# Patient Record
Sex: FEMALE | Race: WHITE | NOT HISPANIC OR LATINO | URBAN - METROPOLITAN AREA
[De-identification: names, ages, dates, MRNs, and addresses within clinical notes are randomized per-mention and may not be internally consistent; named-entity substitution may affect disease eponyms.]

---

## 2019-09-13 ENCOUNTER — INPATIENT (INPATIENT)
Facility: HOSPITAL | Age: 84
LOS: 1 days | Discharge: HOME | DRG: 603 | End: 2019-09-15
Attending: FAMILY MEDICINE | Admitting: FAMILY MEDICINE
Payer: MEDICARE

## 2019-09-13 VITALS — WEIGHT: 138.01 LBS | HEIGHT: 62 IN

## 2019-09-13 DIAGNOSIS — Z98.49 CATARACT EXTRACTION STATUS, UNSPECIFIED EYE: Chronic | ICD-10-CM

## 2019-09-13 DIAGNOSIS — L03.211 CELLULITIS OF FACE: ICD-10-CM

## 2019-09-13 LAB
ALBUMIN SERPL ELPH-MCNC: 3.5 G/DL — SIGNIFICANT CHANGE UP (ref 3.3–5)
ALP SERPL-CCNC: 59 U/L — SIGNIFICANT CHANGE UP (ref 40–120)
ALT FLD-CCNC: 32 U/L — SIGNIFICANT CHANGE UP (ref 12–78)
ANION GAP SERPL CALC-SCNC: 5 MMOL/L — SIGNIFICANT CHANGE UP (ref 5–17)
APPEARANCE UR: CLEAR — SIGNIFICANT CHANGE UP
APTT BLD: 27.1 SEC — LOW (ref 27.5–36.3)
AST SERPL-CCNC: 25 U/L — SIGNIFICANT CHANGE UP (ref 15–37)
BASOPHILS # BLD AUTO: 0.04 K/UL — SIGNIFICANT CHANGE UP (ref 0–0.2)
BASOPHILS NFR BLD AUTO: 0.4 % — SIGNIFICANT CHANGE UP (ref 0–2)
BILIRUB SERPL-MCNC: 0.4 MG/DL — SIGNIFICANT CHANGE UP (ref 0.2–1.2)
BILIRUB UR-MCNC: NEGATIVE — SIGNIFICANT CHANGE UP
BUN SERPL-MCNC: 17 MG/DL — SIGNIFICANT CHANGE UP (ref 7–23)
CALCIUM SERPL-MCNC: 8.8 MG/DL — SIGNIFICANT CHANGE UP (ref 8.5–10.1)
CHLORIDE SERPL-SCNC: 102 MMOL/L — SIGNIFICANT CHANGE UP (ref 96–108)
CO2 SERPL-SCNC: 29 MMOL/L — SIGNIFICANT CHANGE UP (ref 22–31)
COLOR SPEC: YELLOW — SIGNIFICANT CHANGE UP
CREAT SERPL-MCNC: 0.64 MG/DL — SIGNIFICANT CHANGE UP (ref 0.5–1.3)
DIFF PNL FLD: ABNORMAL
EOSINOPHIL # BLD AUTO: 0.02 K/UL — SIGNIFICANT CHANGE UP (ref 0–0.5)
EOSINOPHIL NFR BLD AUTO: 0.2 % — SIGNIFICANT CHANGE UP (ref 0–6)
GLUCOSE SERPL-MCNC: 124 MG/DL — HIGH (ref 70–99)
GLUCOSE UR QL: NEGATIVE MG/DL — SIGNIFICANT CHANGE UP
HCT VFR BLD CALC: 41.3 % — SIGNIFICANT CHANGE UP (ref 34.5–45)
HGB BLD-MCNC: 13.7 G/DL — SIGNIFICANT CHANGE UP (ref 11.5–15.5)
IMM GRANULOCYTES NFR BLD AUTO: 0.7 % — SIGNIFICANT CHANGE UP (ref 0–1.5)
INR BLD: 1.14 RATIO — SIGNIFICANT CHANGE UP (ref 0.88–1.16)
KETONES UR-MCNC: ABNORMAL
LACTATE SERPL-SCNC: 0.9 MMOL/L — SIGNIFICANT CHANGE UP (ref 0.7–2)
LEUKOCYTE ESTERASE UR-ACNC: NEGATIVE — SIGNIFICANT CHANGE UP
LYMPHOCYTES # BLD AUTO: 0.65 K/UL — LOW (ref 1–3.3)
LYMPHOCYTES # BLD AUTO: 7.2 % — LOW (ref 13–44)
MCHC RBC-ENTMCNC: 29.5 PG — SIGNIFICANT CHANGE UP (ref 27–34)
MCHC RBC-ENTMCNC: 33.2 GM/DL — SIGNIFICANT CHANGE UP (ref 32–36)
MCV RBC AUTO: 89 FL — SIGNIFICANT CHANGE UP (ref 80–100)
MONOCYTES # BLD AUTO: 0.84 K/UL — SIGNIFICANT CHANGE UP (ref 0–0.9)
MONOCYTES NFR BLD AUTO: 9.3 % — SIGNIFICANT CHANGE UP (ref 2–14)
NEUTROPHILS # BLD AUTO: 7.41 K/UL — HIGH (ref 1.8–7.4)
NEUTROPHILS NFR BLD AUTO: 82.2 % — HIGH (ref 43–77)
NITRITE UR-MCNC: NEGATIVE — SIGNIFICANT CHANGE UP
PH UR: 5 — SIGNIFICANT CHANGE UP (ref 5–8)
PLATELET # BLD AUTO: 174 K/UL — SIGNIFICANT CHANGE UP (ref 150–400)
POTASSIUM SERPL-MCNC: 3.7 MMOL/L — SIGNIFICANT CHANGE UP (ref 3.5–5.3)
POTASSIUM SERPL-SCNC: 3.7 MMOL/L — SIGNIFICANT CHANGE UP (ref 3.5–5.3)
PROT SERPL-MCNC: 7.5 GM/DL — SIGNIFICANT CHANGE UP (ref 6–8.3)
PROT UR-MCNC: 30 MG/DL
PROTHROM AB SERPL-ACNC: 12.7 SEC — SIGNIFICANT CHANGE UP (ref 10–12.9)
RBC # BLD: 4.64 M/UL — SIGNIFICANT CHANGE UP (ref 3.8–5.2)
RBC # FLD: 13.9 % — SIGNIFICANT CHANGE UP (ref 10.3–14.5)
SODIUM SERPL-SCNC: 136 MMOL/L — SIGNIFICANT CHANGE UP (ref 135–145)
SP GR SPEC: 1.01 — SIGNIFICANT CHANGE UP (ref 1.01–1.02)
UROBILINOGEN FLD QL: NEGATIVE MG/DL — SIGNIFICANT CHANGE UP
WBC # BLD: 9.02 K/UL — SIGNIFICANT CHANGE UP (ref 3.8–10.5)
WBC # FLD AUTO: 9.02 K/UL — SIGNIFICANT CHANGE UP (ref 3.8–10.5)

## 2019-09-13 PROCEDURE — 81001 URINALYSIS AUTO W/SCOPE: CPT

## 2019-09-13 PROCEDURE — 84132 ASSAY OF SERUM POTASSIUM: CPT

## 2019-09-13 PROCEDURE — 85025 COMPLETE CBC W/AUTO DIFF WBC: CPT

## 2019-09-13 PROCEDURE — 93010 ELECTROCARDIOGRAM REPORT: CPT

## 2019-09-13 PROCEDURE — 71045 X-RAY EXAM CHEST 1 VIEW: CPT

## 2019-09-13 PROCEDURE — 71045 X-RAY EXAM CHEST 1 VIEW: CPT | Mod: 26

## 2019-09-13 PROCEDURE — 84100 ASSAY OF PHOSPHORUS: CPT

## 2019-09-13 PROCEDURE — 87086 URINE CULTURE/COLONY COUNT: CPT

## 2019-09-13 PROCEDURE — 36415 COLL VENOUS BLD VENIPUNCTURE: CPT

## 2019-09-13 PROCEDURE — 80048 BASIC METABOLIC PNL TOTAL CA: CPT

## 2019-09-13 PROCEDURE — 83735 ASSAY OF MAGNESIUM: CPT

## 2019-09-13 RX ORDER — VANCOMYCIN HCL 1 G
1000 VIAL (EA) INTRAVENOUS ONCE
Refills: 0 | Status: COMPLETED | OUTPATIENT
Start: 2019-09-13 | End: 2019-09-13

## 2019-09-13 RX ORDER — PIPERACILLIN AND TAZOBACTAM 4; .5 G/20ML; G/20ML
3.38 INJECTION, POWDER, LYOPHILIZED, FOR SOLUTION INTRAVENOUS ONCE
Refills: 0 | Status: COMPLETED | OUTPATIENT
Start: 2019-09-13 | End: 2019-09-13

## 2019-09-13 RX ORDER — INFLUENZA VIRUS VACCINE 15; 15; 15; 15 UG/.5ML; UG/.5ML; UG/.5ML; UG/.5ML
0.5 SUSPENSION INTRAMUSCULAR ONCE
Refills: 0 | Status: COMPLETED | OUTPATIENT
Start: 2019-09-13 | End: 2019-09-13

## 2019-09-13 RX ORDER — DOCUSATE SODIUM 100 MG
100 CAPSULE ORAL DAILY
Refills: 0 | Status: DISCONTINUED | OUTPATIENT
Start: 2019-09-13 | End: 2019-09-15

## 2019-09-13 RX ORDER — PIPERACILLIN AND TAZOBACTAM 4; .5 G/20ML; G/20ML
3.38 INJECTION, POWDER, LYOPHILIZED, FOR SOLUTION INTRAVENOUS EVERY 8 HOURS
Refills: 0 | Status: DISCONTINUED | OUTPATIENT
Start: 2019-09-13 | End: 2019-09-14

## 2019-09-13 RX ORDER — SODIUM CHLORIDE 9 MG/ML
1950 INJECTION INTRAMUSCULAR; INTRAVENOUS; SUBCUTANEOUS ONCE
Refills: 0 | Status: COMPLETED | OUTPATIENT
Start: 2019-09-13 | End: 2019-09-13

## 2019-09-13 RX ORDER — HEPARIN SODIUM 5000 [USP'U]/ML
5000 INJECTION INTRAVENOUS; SUBCUTANEOUS
Refills: 0 | Status: DISCONTINUED | OUTPATIENT
Start: 2019-09-13 | End: 2019-09-15

## 2019-09-13 RX ORDER — SODIUM CHLORIDE 9 MG/ML
1000 INJECTION INTRAMUSCULAR; INTRAVENOUS; SUBCUTANEOUS
Refills: 0 | Status: DISCONTINUED | OUTPATIENT
Start: 2019-09-13 | End: 2019-09-15

## 2019-09-13 RX ADMIN — SODIUM CHLORIDE 100 MILLILITER(S): 9 INJECTION INTRAMUSCULAR; INTRAVENOUS; SUBCUTANEOUS at 22:24

## 2019-09-13 RX ADMIN — HEPARIN SODIUM 5000 UNIT(S): 5000 INJECTION INTRAVENOUS; SUBCUTANEOUS at 22:24

## 2019-09-13 RX ADMIN — Medication 1000 MILLIGRAM(S): at 19:32

## 2019-09-13 RX ADMIN — Medication 250 MILLIGRAM(S): at 18:30

## 2019-09-13 RX ADMIN — SODIUM CHLORIDE 1950 MILLILITER(S): 9 INJECTION INTRAMUSCULAR; INTRAVENOUS; SUBCUTANEOUS at 18:30

## 2019-09-13 RX ADMIN — PIPERACILLIN AND TAZOBACTAM 200 GRAM(S): 4; .5 INJECTION, POWDER, LYOPHILIZED, FOR SOLUTION INTRAVENOUS at 19:32

## 2019-09-13 NOTE — H&P ADULT - NSHPPHYSICALEXAM_GEN_ALL_CORE
ICU Vital Signs Last 24 Hrs    T(F): 98.5 (13 Sep 2019 19:26), Max: 99.8 (13 Sep 2019 17:29)  HR: 87 (13 Sep 2019 19:26) (87 - 108)  BP: 137/76 (13 Sep 2019 19:26) (137/76 - 153/78)    RR: 18 (13 Sep 2019 19:26) (18 - 18)  SpO2: 98% (13 Sep 2019 19:26) (95% - 98%)

## 2019-09-13 NOTE — ED STATDOCS - NS_ ATTENDINGSCRIBEDETAILS _ED_A_ED_FT
I, Jasbir Adams MD,  performed the initial face to face bedside interview with this patient regarding history of present illness, review of symptoms and relevant past medical, social and family history.  I completed an independent physical examination.  I was the initial provider who evaluated this patient.  The history, relevant review of systems, past medical and surgical history, medical decision making, and physical examination was documented by the scribe in my presence and I attest to the accuracy of the documentation.

## 2019-09-13 NOTE — ED ADULT NURSE NOTE - OBJECTIVE STATEMENT
Patient presents to the ED c/o infection. States her symptoms started 5 days ago with a fever (Tmax 101.5), then pt started to develop redness on her forehead. Pt states that she was seen by her PCP Dr. Pandey and was told to come to the ED for the infection. Has been taking Tylenol for her symptoms. Has not taken any abx yet. Denies N/V, SOB, chest pain.

## 2019-09-13 NOTE — ED STATDOCS - PHYSICAL EXAMINATION
Constitutional: NAD AAOx3  Eyes: PERRLA EOMI  Head: Normocephalic atraumatic  Mouth: MMM  Cardiac: regular rate   Resp: Lungs CTAB  GI: Abd s/nt/nd  Neuro: CN2-12 intact  Skin: No rashes +erythema to forehead and mid scalp, warmth. no pain with eye movement, normal ROM of neck, small abrasion to left scalp Constitutional: NAD AAOx3  Eyes: PERRLA EOMI  Head: Normocephalic atraumatic  Mouth: MMM  Cardiac: regular rate   Resp: Lungs CTAB  GI: Abd s/nt/nd  Neuro: CN2-12 intact  Skin: +erythema to forehead and mid scalp, warmth. no pain with eye movement, normal ROM of neck, small abrasion to left scalp

## 2019-09-13 NOTE — ED ADULT NURSE NOTE - NSIMPLEMENTINTERV_GEN_ALL_ED
Implemented All Universal Safety Interventions:  [place] to call system. Call bell, personal items and telephone within reach. Instruct patient to call for assistance. Room bathroom lighting operational. Non-slip footwear when patient is off stretcher. Physically safe environment: no spills, clutter or unnecessary equipment. Stretcher in lowest position, wheels locked, appropriate side rails in place.

## 2019-09-13 NOTE — H&P ADULT - NSICDXFAMILYHX_GEN_ALL_CORE_FT
FAMILY HISTORY:  Family history of coronary artery disease    Sibling  Still living? Yes, Estimated age: 71-80  Family history of von Willebrand disease, Age at diagnosis: Age Unknown

## 2019-09-13 NOTE — ED STATDOCS - CLINICAL SUMMARY MEDICAL DECISION MAKING FREE TEXT BOX
83 y/o female presents to the ED for fever, and cellulitis sent in by PMD, exam with erythema from eyebrows up through mid scalp bilaterally, concern for sepsis secondary to cellulites, will obtain labs, abx and admit.

## 2019-09-13 NOTE — ED STATDOCS - PROGRESS NOTE DETAILS
83 yo female with no significant PMH presents with fever, redness to her forehead. Pt felt something that started sunday and the raash appeared tuesday. Went to see her PDM (Linker) and was told to come to the ER due to having a fever 101.5F in the office. Redness and warmth to touch involving the forehead and spreading to the occipital scalp. Denies bug bites, abd pain, n/v/d, cp. Labs, BC, IV abx for cellulitis of the forehead and scalp. -Raoul Evans PA-C

## 2019-09-13 NOTE — H&P ADULT - NSICDXPASTMEDICALHX_GEN_ALL_CORE_FT
PAST MEDICAL HISTORY:  Arthritis     Hepatitis B resolved    Hip pain, acute, right     Hypertension     Shingles

## 2019-09-13 NOTE — H&P ADULT - HISTORY OF PRESENT ILLNESS
85 y/o female with a PMHx of Shingles, Arthritis, HTN presents to the ED c/o infection. States her symptoms started 5 days ago with a fever (Tmax 101.5), then pt started to develop redness on her forehead. Pt states that she was seen by her PCP Dr. Pandey and was told to come to the ED for the infection. Has been taking Tylenol for her symptoms. Has not taken any abx yet. Denies N/V, SOB, chest pain. Pt is an 83 y/o female with a PMHx of Shingles, Arthritis, HTN not on meds who presents to the ED with c/o fever 101- 102F at home.  She saw her PCP Dr. Pandey today  and was sent to the ED for cellulitis of the scalp ..    Pt reported  her symptoms started with fever  5 days ago with Tmax 101.5.   She then  developed redness on her forehead. She took tylenol.   Pt denies N/V, SOB, chest pain.  Pt reports she last had a skin infection when she had shingles.

## 2019-09-13 NOTE — ED STATDOCS - OBJECTIVE STATEMENT
83 y/o female with a PMHx of Shingles, Arthritis, HTN presents to the ED c/o infection. States her symptoms started 5 days ago with a fever (Tmax 101.5), then pt started to develop redness on her forehead. Pt states that she was seen by her PCP Dr. Pandey and was told to come to the ED for the infection. Has been taking Tylenol for her symptoms. Has not taken any abx yet. Denies N/V, SOB, chest pain.

## 2019-09-13 NOTE — ED STATDOCS - NS ED ROS FT
Constitutional: +fevers  Eyes: No visual changes  HEENT: No throat pain  CV: No chest pain  Resp: No SOB no cough  GI: No abd pain, nausea or vomiting  : No dysuria  MSK: No musculoskeletal pain  Skin: +redness to forehead  Neuro: No headache

## 2019-09-13 NOTE — ED ADULT TRIAGE NOTE - CHIEF COMPLAINT QUOTE
patient ambulated in with a steady gait. states she was sent by her PMD for an infection of the face. face from the bridge of the nose up through the forehead noted to be reddened. patient states it began 2 days ago and spread. patient was told she needs IV antibiotics. + fever at home 101.5F.

## 2019-09-13 NOTE — H&P ADULT - ASSESSMENT
Pt is admitted w/     Cellulitis of the upper face and scalp   - s/p Vanco, Zosyn in  the ED  - s/p NS  1950 ml in the ED  - Blood cx  - ID consult  - DVT prophylaxis heparin  - IMPROVE VTE Individual Risk Assessment    RISK                                                                Points    [  ] Previous VTE                                                  3    [  ] Thrombophilia                                               2    [  ] Lower limb paralysis                                      2        (unable to hold up >15 seconds)      [  ] Current Cancer                                              2         (within 6 months)    [  ] Immobilization > 24 hrs                                1    [  ] ICU/CCU stay > 24 hours                              1    [ X ] Age > 60                                                      1    IMPROVE VTE Score ____1_____    IMPROVE Score 0-1: Low Risk, No VTE prophylaxis required for most patients, encourage ambulation.   IMPROVE Score 2-3: At risk, pharmacologic VTE prophylaxis is indicated for most patients (in the absence of a contraindication)  IMPROVE Score > or = 4: High Risk, pharmacologic VTE prophylaxis is indicated for most patients (in the absence of a contraindication) Pt is admitted w/     Cellulitis of the upper face and scalp   - s/p Vanco, Zosyn in  the ED, will change to Rocephin  - s/p NS  1950 ml in the ED  - Blood cx  - ID consult  - DVT prophylaxis heparin  - IMPROVE VTE Individual Risk Assessment    RISK                                                                Points    [  ] Previous VTE                                                  3    [  ] Thrombophilia                                               2    [  ] Lower limb paralysis                                      2        (unable to hold up >15 seconds)      [  ] Current Cancer                                              2         (within 6 months)    [  ] Immobilization > 24 hrs                                1    [  ] ICU/CCU stay > 24 hours                              1    [ X ] Age > 60                                                      1    IMPROVE VTE Score ____1_____    IMPROVE Score 0-1: Low Risk, No VTE prophylaxis required for most patients, encourage ambulation.   IMPROVE Score 2-3: At risk, pharmacologic VTE prophylaxis is indicated for most patients (in the absence of a contraindication)  IMPROVE Score > or = 4: High Risk, pharmacologic VTE prophylaxis is indicated for most patients (in the absence of a contraindication) Pt is admitted w/     Cellulitis of the upper face and scalp   Hematuria  - s/p Vanco, Zosyn in  the ED, will change to Rocephin  - s/p NS  1950 ml in the ED  - Blood cx  - ID consult  - repeat ua,  follow up with urology if persistent hematuria  - DVT prophylaxis heparin  - IMPROVE VTE Individual Risk Assessment    RISK                                                                Points    [  ] Previous VTE                                                  3    [  ] Thrombophilia                                               2    [  ] Lower limb paralysis                                      2        (unable to hold up >15 seconds)      [  ] Current Cancer                                              2         (within 6 months)    [  ] Immobilization > 24 hrs                                1    [  ] ICU/CCU stay > 24 hours                              1    [ X ] Age > 60                                                      1    IMPROVE VTE Score ____1_____    IMPROVE Score 0-1: Low Risk, No VTE prophylaxis required for most patients, encourage ambulation.   IMPROVE Score 2-3: At risk, pharmacologic VTE prophylaxis is indicated for most patients (in the absence of a contraindication)  IMPROVE Score > or = 4: High Risk, pharmacologic VTE prophylaxis is indicated for most patients (in the absence of a contraindication)

## 2019-09-14 LAB
ANION GAP SERPL CALC-SCNC: 9 MMOL/L — SIGNIFICANT CHANGE UP (ref 5–17)
BASOPHILS # BLD AUTO: 0.04 K/UL — SIGNIFICANT CHANGE UP (ref 0–0.2)
BASOPHILS NFR BLD AUTO: 0.6 % — SIGNIFICANT CHANGE UP (ref 0–2)
BUN SERPL-MCNC: 11 MG/DL — SIGNIFICANT CHANGE UP (ref 7–23)
CALCIUM SERPL-MCNC: 8 MG/DL — LOW (ref 8.5–10.1)
CHLORIDE SERPL-SCNC: 107 MMOL/L — SIGNIFICANT CHANGE UP (ref 96–108)
CO2 SERPL-SCNC: 25 MMOL/L — SIGNIFICANT CHANGE UP (ref 22–31)
CREAT SERPL-MCNC: 0.51 MG/DL — SIGNIFICANT CHANGE UP (ref 0.5–1.3)
CULTURE RESULTS: SIGNIFICANT CHANGE UP
EOSINOPHIL # BLD AUTO: 0.06 K/UL — SIGNIFICANT CHANGE UP (ref 0–0.5)
EOSINOPHIL NFR BLD AUTO: 0.9 % — SIGNIFICANT CHANGE UP (ref 0–6)
GLUCOSE SERPL-MCNC: 98 MG/DL — SIGNIFICANT CHANGE UP (ref 70–99)
HCT VFR BLD CALC: 38.1 % — SIGNIFICANT CHANGE UP (ref 34.5–45)
HGB BLD-MCNC: 12.6 G/DL — SIGNIFICANT CHANGE UP (ref 11.5–15.5)
IMM GRANULOCYTES NFR BLD AUTO: 0.5 % — SIGNIFICANT CHANGE UP (ref 0–1.5)
LYMPHOCYTES # BLD AUTO: 1.23 K/UL — SIGNIFICANT CHANGE UP (ref 1–3.3)
LYMPHOCYTES # BLD AUTO: 19.2 % — SIGNIFICANT CHANGE UP (ref 13–44)
MCHC RBC-ENTMCNC: 30 PG — SIGNIFICANT CHANGE UP (ref 27–34)
MCHC RBC-ENTMCNC: 33.1 GM/DL — SIGNIFICANT CHANGE UP (ref 32–36)
MCV RBC AUTO: 90.7 FL — SIGNIFICANT CHANGE UP (ref 80–100)
MONOCYTES # BLD AUTO: 0.88 K/UL — SIGNIFICANT CHANGE UP (ref 0–0.9)
MONOCYTES NFR BLD AUTO: 13.8 % — SIGNIFICANT CHANGE UP (ref 2–14)
NEUTROPHILS # BLD AUTO: 4.16 K/UL — SIGNIFICANT CHANGE UP (ref 1.8–7.4)
NEUTROPHILS NFR BLD AUTO: 65 % — SIGNIFICANT CHANGE UP (ref 43–77)
PLATELET # BLD AUTO: 172 K/UL — SIGNIFICANT CHANGE UP (ref 150–400)
POTASSIUM SERPL-MCNC: 3.4 MMOL/L — LOW (ref 3.5–5.3)
POTASSIUM SERPL-SCNC: 3.4 MMOL/L — LOW (ref 3.5–5.3)
RBC # BLD: 4.2 M/UL — SIGNIFICANT CHANGE UP (ref 3.8–5.2)
RBC # FLD: 14.2 % — SIGNIFICANT CHANGE UP (ref 10.3–14.5)
SODIUM SERPL-SCNC: 141 MMOL/L — SIGNIFICANT CHANGE UP (ref 135–145)
SPECIMEN SOURCE: SIGNIFICANT CHANGE UP
WBC # BLD: 6.4 K/UL — SIGNIFICANT CHANGE UP (ref 3.8–10.5)
WBC # FLD AUTO: 6.4 K/UL — SIGNIFICANT CHANGE UP (ref 3.8–10.5)

## 2019-09-14 RX ORDER — ACETAMINOPHEN 500 MG
500 TABLET ORAL EVERY 6 HOURS
Refills: 0 | Status: DISCONTINUED | OUTPATIENT
Start: 2019-09-14 | End: 2019-09-15

## 2019-09-14 RX ORDER — ASCORBIC ACID 60 MG
500 TABLET,CHEWABLE ORAL DAILY
Refills: 0 | Status: DISCONTINUED | OUTPATIENT
Start: 2019-09-14 | End: 2019-09-15

## 2019-09-14 RX ORDER — VANCOMYCIN HCL 1 G
750 VIAL (EA) INTRAVENOUS EVERY 12 HOURS
Refills: 0 | Status: DISCONTINUED | OUTPATIENT
Start: 2019-09-14 | End: 2019-09-15

## 2019-09-14 RX ORDER — CEFTRIAXONE 500 MG/1
1000 INJECTION, POWDER, FOR SOLUTION INTRAMUSCULAR; INTRAVENOUS EVERY 24 HOURS
Refills: 0 | Status: DISCONTINUED | OUTPATIENT
Start: 2019-09-14 | End: 2019-09-15

## 2019-09-14 RX ORDER — POTASSIUM CHLORIDE 20 MEQ
40 PACKET (EA) ORAL ONCE
Refills: 0 | Status: COMPLETED | OUTPATIENT
Start: 2019-09-14 | End: 2019-09-14

## 2019-09-14 RX ADMIN — Medication 100 MILLIGRAM(S): at 17:43

## 2019-09-14 RX ADMIN — Medication 500 MILLIGRAM(S): at 20:57

## 2019-09-14 RX ADMIN — HEPARIN SODIUM 5000 UNIT(S): 5000 INJECTION INTRAVENOUS; SUBCUTANEOUS at 17:43

## 2019-09-14 RX ADMIN — Medication 1 TABLET(S): at 14:08

## 2019-09-14 RX ADMIN — CEFTRIAXONE 1000 MILLIGRAM(S): 500 INJECTION, POWDER, FOR SOLUTION INTRAMUSCULAR; INTRAVENOUS at 08:34

## 2019-09-14 RX ADMIN — Medication 40 MILLIEQUIVALENT(S): at 12:25

## 2019-09-14 RX ADMIN — HEPARIN SODIUM 5000 UNIT(S): 5000 INJECTION INTRAVENOUS; SUBCUTANEOUS at 05:34

## 2019-09-14 RX ADMIN — Medication 500 MILLIGRAM(S): at 06:02

## 2019-09-14 RX ADMIN — SODIUM CHLORIDE 100 MILLILITER(S): 9 INJECTION INTRAMUSCULAR; INTRAVENOUS; SUBCUTANEOUS at 07:34

## 2019-09-14 RX ADMIN — SODIUM CHLORIDE 100 MILLILITER(S): 9 INJECTION INTRAMUSCULAR; INTRAVENOUS; SUBCUTANEOUS at 08:34

## 2019-09-14 RX ADMIN — Medication 500 MILLIGRAM(S): at 05:37

## 2019-09-14 RX ADMIN — SODIUM CHLORIDE 100 MILLILITER(S): 9 INJECTION INTRAMUSCULAR; INTRAVENOUS; SUBCUTANEOUS at 20:56

## 2019-09-14 RX ADMIN — Medication 500 MILLIGRAM(S): at 14:08

## 2019-09-14 RX ADMIN — Medication 250 MILLIGRAM(S): at 17:43

## 2019-09-14 NOTE — PROGRESS NOTE ADULT - SUBJECTIVE AND OBJECTIVE BOX
HOSPITALIST PROGRESS NOTE:  SUBJECTIVE:  PCP:  Chief Complaint: Patient is a 84y old  Female who presents with a chief complaint of fever  cellulitis of the scalp (14 Sep 2019 09:06)      HPI:  Pt is an 85 y/o female with a PMHx of Shingles, Arthritis, HTN not on meds who presents to the ED with c/o fever 101- 102F at home.  She saw her PCP Dr. Pandey today  and was sent to the ED for cellulitis of the scalp ..  Pt reported  her symptoms started with fever  5 days ago with Tmax 101.5.   She then  developed redness on her forehead. She took tylenol.   Pt denies N/V, SOB, chest pain.  Pt reports she last had a skin infection when she had shingles. (13 Sep 2019 20:12)    : Redness all over the top of her forehead and down her eyes; as per the patient it is worse today compared to yesterday.      Allergies:  Boric Acid (Urticaria)    REVIEW OF SYSTEMS:  See HPI. All other review of systems is negative unless indicated above.     OBJECTIVE  Physical Exam:  Vital Signs Last 24 Hrs  T(C): 36.9 (14 Sep 2019 10:58), Max: 37.8 (13 Sep 2019 21:00)  T(F): 98.4 (14 Sep 2019 10:58), Max: 100 (13 Sep 2019 21:00)  HR: 73 (14 Sep 2019 10:58) (73 - 108)  BP: 138/53 (14 Sep 2019 10:58) (137/57 - 153/78)  BP(mean): --  RR: 17 (14 Sep 2019 10:58) (16 - 18)  SpO2: 100% (14 Sep 2019 10:58) (95% - 100%)      Constitutional: NAD, awake and alert, well-developed  Neurological: AAO x 3, no focal deficits  HEENT: PERRLA, EOMI, MMM  Neck: Soft and supple, No LAD, No JVD  Respiratory: Breath sounds are clear bilaterally, No wheezing, rales or rhonchi  Cardiovascular: S1 and S2, regular rate and rhythm; no Murmurs, gallops or rubs  Gastrointestinal: Bowel Sounds present, soft, nontender, nondistended, no guarding, no rebound tenderness  Back: No CVA tenderness   Extremities: No peripheral edema  Vascular: 2+ peripheral pulses  Musculoskeletal: 5/5 strength b/l upper and lower extremities  Skin: +redness of scalp and face   Breast: Deferred  Rectal: Deferred    MEDICATIONS  (STANDING):  cefTRIAXone Injectable. 1000 milliGRAM(s) IV Push every 24 hours  docusate sodium 100 milliGRAM(s) Oral daily  heparin  Injectable 5000 Unit(s) SubCutaneous two times a day  influenza   Vaccine 0.5 milliLiter(s) IntraMuscular once  sodium chloride 0.9%. 1000 milliLiter(s) (100 mL/Hr) IV Continuous <Continuous>  vancomycin  IVPB 750 milliGRAM(s) IV Intermittent every 12 hours      LABS: All Labs Reviewed:                        12.6   6.40  )-----------( 172      ( 14 Sep 2019 08:02 )             38.1         141  |  107  |  11  ----------------------------<  98  3.4<L>   |  25  |  0.51    Ca    8.0<L>      14 Sep 2019 08:02    TPro  7.5  /  Alb  3.5  /  TBili  0.4  /  DBili  x   /  AST  25  /  ALT  32  /  AlkPhos  59  09-13    PT/INR - ( 13 Sep 2019 18:13 )   PT: 12.7 sec;   INR: 1.14 ratio         PTT - ( 13 Sep 2019 18:13 )  PTT:27.1 sec        Urinalysis Basic - ( 13 Sep 2019 21:00 )    Color: Yellow / Appearance: Clear / S.010 / pH: x  Gluc: x / Ketone: Small  / Bili: Negative / Urobili: Negative mg/dL   Blood: x / Protein: 30 mg/dL / Nitrite: Negative   Leuk Esterase: Negative / RBC: 6-10 /HPF / WBC Negative   Sq Epi: x / Non Sq Epi: Occasional / Bacteria: Moderate        RADIOLOGY/EKG:    < from: Xray Chest 1 View-PORTABLE IMMEDIATE (19 @ 19:41) >    Impression: No active pulmonary disease.    < end of copied text >

## 2019-09-14 NOTE — CONSULT NOTE ADULT - SUBJECTIVE AND OBJECTIVE BOX
Patient is a 84y old  Female who presents with a chief complaint of fever and cellulitis of the scalp    HPI:  83 y/o female with h/o shingles, arthritis, HTN not on meds was admitted on  for fever 101- 102F at home and scalp redness. She saw her PCP Dr. Pandey and was sent to the ED for further treatment. Pt reported  her symptoms started with fever  5 days ago with Tmax 101.5F. She then  developed redness on her forehead. In ER she was found febrile and received ceftriaxone, vancomycin IV and zosyn.    PMH: as above  PSH: as above  Meds: per reconciliation sheet, noted below  MEDICATIONS  (STANDING):  cefTRIAXone Injectable. 1000 milliGRAM(s) IV Push every 24 hours  docusate sodium 100 milliGRAM(s) Oral daily  heparin  Injectable 5000 Unit(s) SubCutaneous two times a day  influenza   Vaccine 0.5 milliLiter(s) IntraMuscular once  sodium chloride 0.9%. 1000 milliLiter(s) (100 mL/Hr) IV Continuous <Continuous>    MEDICATIONS  (PRN):  acetaminophen   Tablet .. 500 milliGRAM(s) Oral every 6 hours PRN Mild Pain (1 - 3)    Allergies    Boric Acid (Urticaria)    Intolerances      Social: no smoking, no alcohol, no illegal drugs; no recent travel, no exposure to TB  FAMILY HISTORY:  Family history of von Willebrand disease (Sibling)  Family history of coronary artery disease    no history of premature cardiovascular disease in first degree relatives    ROS: the patient denies HA, no seizures, no dizziness, no sore throat, no nasal congestion, no blurry vision, no CP, no palpitations, no SOB, no cough, no abdominal pain, no diarrhea, no N/V, no dysuria, no leg pain, no claudication, has scalp rash, no joint aches, no rectal pain or bleeding, no night sweats  All other systems reviewed and are negative    Vital Signs Last 24 Hrs  T(C): 37.2 (14 Sep 2019 05:12), Max: 37.8 (13 Sep 2019 21:00)  T(F): 98.9 (14 Sep 2019 05:12), Max: 100 (13 Sep 2019 21:00)  HR: 82 (14 Sep 2019 05:12) (82 - 108)  BP: 139/51 (14 Sep 2019 05:12) (137/57 - 153/78)  BP(mean): --  RR: 17 (14 Sep 2019 05:12) (16 - 18)  SpO2: 98% (14 Sep 2019 05:12) (95% - 98%)  Daily Height in cm: 157.48 (13 Sep 2019 17:11)    Daily     PE:    Constitutional: frail looking  HEENT: NC/AT, EOMI, PERRLA, conjunctivae clear; ears and nose atraumatic; pharynx benign  Scalp erythema, edema and tenderness  Neck: supple; thyroid not palpable  Back: no tenderness  Respiratory: respiratory effort normal; clear to auscultation  Cardiovascular: S1S2 regular, no murmurs  Abdomen: soft, not tender, not distended, positive BS; no liver or spleen organomegaly  Genitourinary: no suprapubic tenderness  Lymphatic: no LN palpable  Musculoskeletal: no muscle tenderness, no joint swelling or tenderness  Extremities: no pedal edema  Neurological/ Psychiatric: AxOx3, judgement and insight normal; moving all extremities  Skin: no rashes; no palpable lesions    Labs: all available labs reviewed                        12.6   6.40  )-----------( 172      ( 14 Sep 2019 08:02 )             38.1     09-14    141  |  107  |  11  ----------------------------<  98  3.4<L>   |  25  |  0.51    Ca    8.0<L>      14 Sep 2019 08:02    TPro  7.5  /  Alb  3.5  /  TBili  0.4  /  DBili  x   /  AST  25  /  ALT  32  /  AlkPhos  59  09-13     LIVER FUNCTIONS - ( 13 Sep 2019 18:13 )  Alb: 3.5 g/dL / Pro: 7.5 gm/dL / ALK PHOS: 59 U/L / ALT: 32 U/L / AST: 25 U/L / GGT: x           Urinalysis Basic - ( 13 Sep 2019 21:00 )    Color: Yellow / Appearance: Clear / S.010 / pH: x  Gluc: x / Ketone: Small  / Bili: Negative / Urobili: Negative mg/dL   Blood: x / Protein: 30 mg/dL / Nitrite: Negative   Leuk Esterase: Negative / RBC: 6-10 /HPF / WBC Negative   Sq Epi: x / Non Sq Epi: Occasional / Bacteria: Moderate      Radiology: all available radiological tests reviewed    < from: Xray Chest 1 View-PORTABLE IMMEDIATE (19 @ 19:41) >   No active pulmonary disease.    < end of copied text >      Advanced directives addressed: full resuscitation Patient is a 84y old  Female who presents with a chief complaint of fever and cellulitis of the scalp    HPI:  83 y/o female with h/o shingles, arthritis, HTN not on meds was admitted on  for fever 101- 102F at home and scalp redness. She saw her PCP Dr. Pandey and was sent to the ED for further treatment. Pt reported  her symptoms started with fever  5 days ago with Tmax 101.5F. She then  developed redness on her forehead. In ER she was found febrile and received ceftriaxone, vancomycin IV and zosyn.    PMH: as above  PSH: as above  Meds: per reconciliation sheet, noted below  MEDICATIONS  (STANDING):  cefTRIAXone Injectable. 1000 milliGRAM(s) IV Push every 24 hours  docusate sodium 100 milliGRAM(s) Oral daily  heparin  Injectable 5000 Unit(s) SubCutaneous two times a day  influenza   Vaccine 0.5 milliLiter(s) IntraMuscular once  sodium chloride 0.9%. 1000 milliLiter(s) (100 mL/Hr) IV Continuous <Continuous>    MEDICATIONS  (PRN):  acetaminophen   Tablet .. 500 milliGRAM(s) Oral every 6 hours PRN Mild Pain (1 - 3)    Allergies    Boric Acid (Urticaria)    Intolerances      Social: no smoking, no alcohol, no illegal drugs; no recent travel, no exposure to TB  FAMILY HISTORY:  Family history of von Willebrand disease (Sibling)  Family history of coronary artery disease    no history of premature cardiovascular disease in first degree relatives    ROS: the patient denies HA, no seizures, no dizziness, no sore throat, no nasal congestion, no blurry vision, no CP, no palpitations, no SOB, no cough, no abdominal pain, no diarrhea, no N/V, no dysuria, no leg pain, no claudication, has scalp/ face rash, no joint aches, no rectal pain or bleeding, no night sweats  All other systems reviewed and are negative    Vital Signs Last 24 Hrs  T(C): 37.2 (14 Sep 2019 05:12), Max: 37.8 (13 Sep 2019 21:00)  T(F): 98.9 (14 Sep 2019 05:12), Max: 100 (13 Sep 2019 21:00)  HR: 82 (14 Sep 2019 05:12) (82 - 108)  BP: 139/51 (14 Sep 2019 05:12) (137/57 - 153/78)  BP(mean): --  RR: 17 (14 Sep 2019 05:12) (16 - 18)  SpO2: 98% (14 Sep 2019 05:12) (95% - 98%)  Daily Height in cm: 157.48 (13 Sep 2019 17:11)    Daily     PE:    Constitutional: frail looking  HEENT: NC/AT, EOMI, PERRLA, conjunctivae clear; ears and nose atraumatic; pharynx benign  Anterior scalp and frontal area erythema, edema and tenderness; no skin break; no discharge  Neck: supple; thyroid not palpable  Back: no tenderness  Respiratory: respiratory effort normal; clear to auscultation  Cardiovascular: S1S2 regular, no murmurs  Abdomen: soft, not tender, not distended, positive BS; no liver or spleen organomegaly  Genitourinary: no suprapubic tenderness  Lymphatic: no LN palpable  Musculoskeletal: no muscle tenderness, no joint swelling or tenderness  Extremities: no pedal edema  Neurological/ Psychiatric: AxOx3, judgement and insight normal; moving all extremities  Skin: no rashes; no palpable lesions    Labs: all available labs reviewed                        12.6   6.40  )-----------( 172      ( 14 Sep 2019 08:02 )             38.1     09-14    141  |  107  |  11  ----------------------------<  98  3.4<L>   |  25  |  0.51    Ca    8.0<L>      14 Sep 2019 08:02    TPro  7.5  /  Alb  3.5  /  TBili  0.4  /  DBili  x   /  AST  25  /  ALT  32  /  AlkPhos  59  09-13     LIVER FUNCTIONS - ( 13 Sep 2019 18:13 )  Alb: 3.5 g/dL / Pro: 7.5 gm/dL / ALK PHOS: 59 U/L / ALT: 32 U/L / AST: 25 U/L / GGT: x           Urinalysis Basic - ( 13 Sep 2019 21:00 )    Color: Yellow / Appearance: Clear / S.010 / pH: x  Gluc: x / Ketone: Small  / Bili: Negative / Urobili: Negative mg/dL   Blood: x / Protein: 30 mg/dL / Nitrite: Negative   Leuk Esterase: Negative / RBC: 6-10 /HPF / WBC Negative   Sq Epi: x / Non Sq Epi: Occasional / Bacteria: Moderate      Radiology: all available radiological tests reviewed    < from: Xray Chest 1 View-PORTABLE IMMEDIATE (19 @ 19:41) >   No active pulmonary disease.    < end of copied text >      Advanced directives addressed: full resuscitation

## 2019-09-14 NOTE — CONSULT NOTE ADULT - ASSESSMENT
85 y/o female with h/o shingles, arthritis, HTN not on meds was admitted on 9/13 for fever 101- 102F at home and scalp redness. She saw her PCP Dr. Pandey and was sent to the ED for further treatment. Pt reported  her symptoms started with fever  5 days ago with Tmax 101.5F. She then  developed redness on her forehead. In ER she was found febrile and received ceftriaxone, vancomycin IV and zosyn.    1. Febrile syndrome. 85 y/o female with h/o shingles, arthritis, HTN not on meds was admitted on 9/13 for fever 101- 102F at home and scalp redness. She saw her PCP Dr. Pandey and was sent to the ED for further treatment. Pt reported  her symptoms started with fever  5 days ago with Tmax 101.5F. She then  developed redness on her forehead. In ER she was found febrile and received ceftriaxone, vancomycin IV and zosyn.    1. Febrile syndrome. Frontal and anterior scalp cellulitis  -no vesicular rash to suggest herpes zoster  -obtain BC x 2  -start vancomycin 750 mg IV q12h and ceftriaxone 1 gm IV qd  -reason for abx use and side effects reviewed with patient; monitor BMP and vancomycin trough levels  -old chart reviewed to assess prior cultures  -monitor temps  -f/u CBC  -supportive care  2. Other issues:   -care per medicine

## 2019-09-14 NOTE — PROGRESS NOTE ADULT - ASSESSMENT
*Fever 2ndry to Cellulitis of the upper face and scalp   - continue Vanco and rocephin  - Blood cx  - ID consult appreciated    *Microscopic Hematuria  - repeat ua as outpatient,  follow up with PCP    *DVT prophylaxis heparin

## 2019-09-15 ENCOUNTER — TRANSCRIPTION ENCOUNTER (OUTPATIENT)
Age: 84
End: 2019-09-15

## 2019-09-15 VITALS
TEMPERATURE: 99 F | OXYGEN SATURATION: 100 % | RESPIRATION RATE: 18 BRPM | DIASTOLIC BLOOD PRESSURE: 70 MMHG | SYSTOLIC BLOOD PRESSURE: 150 MMHG | HEART RATE: 82 BPM

## 2019-09-15 LAB
MAGNESIUM SERPL-MCNC: 2.1 MG/DL — SIGNIFICANT CHANGE UP (ref 1.6–2.6)
PHOSPHATE SERPL-MCNC: 2.5 MG/DL — SIGNIFICANT CHANGE UP (ref 2.5–4.5)
POTASSIUM SERPL-MCNC: 3.8 MMOL/L — SIGNIFICANT CHANGE UP (ref 3.5–5.3)
POTASSIUM SERPL-SCNC: 3.8 MMOL/L — SIGNIFICANT CHANGE UP (ref 3.5–5.3)

## 2019-09-15 RX ORDER — LACTOBACILLUS ACIDOPHILUS 100MM CELL
1 CAPSULE ORAL
Qty: 14 | Refills: 0
Start: 2019-09-15 | End: 2019-09-21

## 2019-09-15 RX ORDER — CEFUROXIME AXETIL 250 MG
1 TABLET ORAL
Qty: 14 | Refills: 0
Start: 2019-09-15 | End: 2019-09-21

## 2019-09-15 RX ADMIN — Medication 250 MILLIGRAM(S): at 04:54

## 2019-09-15 RX ADMIN — HEPARIN SODIUM 5000 UNIT(S): 5000 INJECTION INTRAVENOUS; SUBCUTANEOUS at 04:55

## 2019-09-15 RX ADMIN — CEFTRIAXONE 1000 MILLIGRAM(S): 500 INJECTION, POWDER, FOR SOLUTION INTRAMUSCULAR; INTRAVENOUS at 08:17

## 2019-09-15 NOTE — PROGRESS NOTE ADULT - SUBJECTIVE AND OBJECTIVE BOX
HOSPITALIST PROGRESS NOTE:  SUBJECTIVE:  PCP:  Chief Complaint: Patient is a 84y old  Female who presents with a chief complaint of fever  cellulitis of the scalp (14 Sep 2019 09:06)      HPI:  Pt is an 83 y/o female with a PMHx of Shingles, Arthritis, HTN not on meds who presents to the ED with c/o fever 101- 102F at home.  She saw her PCP Dr. Pandey today  and was sent to the ED for cellulitis of the scalp ..  Pt reported  her symptoms started with fever  5 days ago with Tmax 101.5.   She then  developed redness on her forehead. She took tylenol.   Pt denies N/V, SOB, chest pain.  Pt reports she last had a skin infection when she had shingles. (13 Sep 2019 20:12)    : Redness all over the top of her forehead and down her eyes; as per the patient it is worse today compared to yesterday.  9/15: redness improving; patient wants to go home.        Allergies:  Boric Acid (Urticaria)    REVIEW OF SYSTEMS:  See HPI. All other review of systems is negative unless indicated above.     OBJECTIVE  Physical Exam:  Vital Signs Last 24 Hrs  T(C): 36.9 (15 Sep 2019 04:40), Max: 36.9 (14 Sep 2019 10:58)  T(F): 98.4 (15 Sep 2019 04:40), Max: 98.4 (14 Sep 2019 10:58)  HR: 71 (15 Sep 2019 04:40) (71 - 79)  BP: 142/69 (15 Sep 2019 04:40) (138/53 - 150/59)  BP(mean): --  RR: 16 (15 Sep 2019 04:40) (16 - 17)  SpO2: 97% (15 Sep 2019 04:40) (97% - 100%)    Constitutional: NAD, awake and alert, well-developed  Neurological: AAO x 3, no focal deficits  HEENT: PERRLA, EOMI, MMM  Neck: Soft and supple, No LAD, No JVD  Respiratory: Breath sounds are clear bilaterally, No wheezing, rales or rhonchi  Cardiovascular: S1 and S2, regular rate and rhythm; no Murmurs, gallops or rubs  Gastrointestinal: Bowel Sounds present, soft, nontender, nondistended, no guarding, no rebound tenderness  Back: No CVA tenderness   Extremities: No peripheral edema  Vascular: 2+ peripheral pulses  Musculoskeletal: 5/5 strength b/l upper and lower extremities  Skin: +redness of scalp and face   Breast: Deferred  Rectal: Deferred    MEDICATIONS  (STANDING):  cefTRIAXone Injectable. 1000 milliGRAM(s) IV Push every 24 hours  docusate sodium 100 milliGRAM(s) Oral daily  heparin  Injectable 5000 Unit(s) SubCutaneous two times a day  influenza   Vaccine 0.5 milliLiter(s) IntraMuscular once  sodium chloride 0.9%. 1000 milliLiter(s) (100 mL/Hr) IV Continuous <Continuous>  vancomycin  IVPB 750 milliGRAM(s) IV Intermittent every 12 hours    Lab Results:  CBC  CBC Full  -  ( 14 Sep 2019 08:02 )  WBC Count : 6.40 K/uL  RBC Count : 4.20 M/uL  Hemoglobin : 12.6 g/dL  Hematocrit : 38.1 %  Platelet Count - Automated : 172 K/uL  Mean Cell Volume : 90.7 fl  Mean Cell Hemoglobin : 30.0 pg  Mean Cell Hemoglobin Concentration : 33.1 gm/dL  Auto Neutrophil # : 4.16 K/uL  Auto Lymphocyte # : 1.23 K/uL  Auto Monocyte # : 0.88 K/uL  Auto Eosinophil # : 0.06 K/uL  Auto Basophil # : 0.04 K/uL  Auto Neutrophil % : 65.0 %  Auto Lymphocyte % : 19.2 %  Auto Monocyte % : 13.8 %  Auto Eosinophil % : 0.9 %  Auto Basophil % : 0.6 %    .		Differential:	[] Automated		[] Manual  Chemistry                        12.6   6.40  )-----------( 172      ( 14 Sep 2019 08:02 )             38.1     09-15    x   |  x   |  x   ----------------------------<  x   3.8   |  x   |  x     Ca    8.0<L>      14 Sep 2019 08:02  Phos  2.5     09-15  Mg     2.1     09-15    TPro  7.5  /  Alb  3.5  /  TBili  0.4  /  DBili  x   /  AST  25  /  ALT  32  /  AlkPhos  59  09-13    LIVER FUNCTIONS - ( 13 Sep 2019 18:13 )  Alb: 3.5 g/dL / Pro: 7.5 gm/dL / ALK PHOS: 59 U/L / ALT: 32 U/L / AST: 25 U/L / GGT: x           PT/INR - ( 13 Sep 2019 18:13 )   PT: 12.7 sec;   INR: 1.14 ratio         PTT - ( 13 Sep 2019 18:13 )  PTT:27.1 sec    Urinalysis Basic - ( 13 Sep 2019 21:00 )    Color: Yellow / Appearance: Clear / S.010 / pH: x  Gluc: x / Ketone: Small  / Bili: Negative / Urobili: Negative mg/dL   Blood: x / Protein: 30 mg/dL / Nitrite: Negative   Leuk Esterase: Negative / RBC: 6-10 /HPF / WBC Negative   Sq Epi: x / Non Sq Epi: Occasional / Bacteria: Moderate      MICROBIOLOGY/CULTURES:  Culture Results:   <10,000 CFU/mL Normal Urogenital Venita ( @ 21:00)  Culture Results:   No growth to date. ( @ 18:13)  Culture Results:   No growth to date. ( @ 18:13)      RADIOLOGY/EKG:    < from: Xray Chest 1 View-PORTABLE IMMEDIATE (19 @ 19:41) >    Impression: No active pulmonary disease.    < end of copied text >

## 2019-09-15 NOTE — DISCHARGE NOTE PROVIDER - NSDCCPCAREPLAN_GEN_ALL_CORE_FT
PRINCIPAL DISCHARGE DIAGNOSIS  Diagnosis: Facial cellulitis  Assessment and Plan of Treatment: blood and urine Cx NGTD  continue ceftin 500mg BID X 7 more days with probiotic  FU with Dr escamilla      SECONDARY DISCHARGE DIAGNOSES  Diagnosis: Cellulitis of scalp  Assessment and Plan of Treatment:

## 2019-09-15 NOTE — PROGRESS NOTE ADULT - ASSESSMENT
*Fever 2ndry to Cellulitis of the upper face and scalp   - continue Vanco and rocephin#2  change abx to ceftin 500 mg PO q12h for 7 more days   - Blood cx and Urine Cx NGTD  - ID consult appreciated    *Microscopic Hematuria  - repeat ua as outpatient,  follow up with PCP    *DVT prophylaxis heparin

## 2019-09-15 NOTE — PROGRESS NOTE ADULT - SUBJECTIVE AND OBJECTIVE BOX
Date of service: 09-15-19 @ 09:18    Lying in bed in NAD  Face rash is improving  Few scales, peeling of face  No fever    ROS: no fever or chills; denies dizziness, no HA, no SOB or cough, no abdominal pain, no diarrhea or constipation; no dysuria, no legs pain, no new rashes    MEDICATIONS  (STANDING):  ascorbic acid 500 milliGRAM(s) Oral daily  calcium carbonate 1250 mG  + Vitamin D (OsCal 500 + D) 1 Tablet(s) Oral daily  cefTRIAXone Injectable. 1000 milliGRAM(s) IV Push every 24 hours  docusate sodium 100 milliGRAM(s) Oral daily  heparin  Injectable 5000 Unit(s) SubCutaneous two times a day  influenza   Vaccine 0.5 milliLiter(s) IntraMuscular once  multivitamin 1 Tablet(s) Oral daily  sodium chloride 0.9%. 1000 milliLiter(s) (100 mL/Hr) IV Continuous <Continuous>  vancomycin  IVPB 750 milliGRAM(s) IV Intermittent every 12 hours      Vital Signs Last 24 Hrs  T(C): 36.9 (15 Sep 2019 04:40), Max: 36.9 (14 Sep 2019 10:58)  T(F): 98.4 (15 Sep 2019 04:40), Max: 98.4 (14 Sep 2019 10:58)  HR: 71 (15 Sep 2019 04:40) (71 - 79)  BP: 142/69 (15 Sep 2019 04:40) (138/53 - 150/59)  BP(mean): --  RR: 16 (15 Sep 2019 04:40) (16 - 17)  SpO2: 97% (15 Sep 2019 04:40) (97% - 100%)    Physical Exam:      Constitutional: frail looking  HEENT: NC/AT, EOMI, PERRLA, conjunctivae clear  Anterior scalp and frontal area erythema, edema and tenderness; no skin break; no discharge - improving  Neck: supple; thyroid not palpable  Back: no tenderness  Respiratory: respiratory effort normal; clear to auscultation  Cardiovascular: S1S2 regular, no murmurs  Abdomen: soft, not tender, not distended, positive BS  Genitourinary: no suprapubic tenderness  Lymphatic: no LN palpable  Musculoskeletal: no muscle tenderness, no joint swelling or tenderness  Extremities: no pedal edema  Neurological/ Psychiatric: AxOx3, moving all extremities  Skin: no rashes; no palpable lesions    Labs: reviewed                        12.6   6.40  )-----------( 172      ( 14 Sep 2019 08:02 )             38.1     -14    141  |  107  |  11  ----------------------------<  98  3.4<L>   |  25  |  0.51    Ca    8.0<L>      14 Sep 2019 08:02    TPro  7.5  /  Alb  3.5  /  TBili  0.4  /  DBili  x   /  AST  25  /  ALT  32  /  AlkPhos  59  09-13     LIVER FUNCTIONS - ( 13 Sep 2019 18:13 )  Alb: 3.5 g/dL / Pro: 7.5 gm/dL / ALK PHOS: 59 U/L / ALT: 32 U/L / AST: 25 U/L / GGT: x           Urinalysis Basic - ( 13 Sep 2019 21:00 )    Color: Yellow / Appearance: Clear / S.010 / pH: x  Gluc: x / Ketone: Small  / Bili: Negative / Urobili: Negative mg/dL   Blood: x / Protein: 30 mg/dL / Nitrite: Negative   Leuk Esterase: Negative / RBC: 6-10 /HPF / WBC Negative   Sq Epi: x / Non Sq Epi: Occasional / Bacteria: Moderate      Culture - Urine (collected 13 Sep 2019 21:00)  Source: .Urine Clean Catch (Midstream)  Final Report (14 Sep 2019 22:41):    <10,000 CFU/mL Normal Urogenital Venita    Culture - Blood (collected 13 Sep 2019 18:13)  Source: .Blood None  Preliminary Report (14 Sep 2019 23:01):    No growth to date.    Culture - Blood (collected 13 Sep 2019 18:13)  Source: .Blood None  Preliminary Report (14 Sep 2019 23:01):    No growth to date.      Radiology: all available radiological tests reviewed    < from: Xray Chest 1 View-PORTABLE IMMEDIATE (19 @ 19:41) >   No active pulmonary disease.    < end of copied text >      Advanced directives addressed: full resuscitation

## 2019-09-15 NOTE — DISCHARGE NOTE PROVIDER - HOSPITAL COURSE
HOSPITALIST PROGRESS NOTE:    SUBJECTIVE:    PCP:    Chief Complaint: Patient is a 84y old  Female who presents with a chief complaint of fever    cellulitis of the scalp (14 Sep 2019 09:06)            HPI:    Pt is an 85 y/o female with a PMHx of Shingles, Arthritis, HTN not on meds who presents to the ED with c/o fever 101- 102F at home.  She saw her PCP Dr. Pandey today  and was sent to the ED for cellulitis of the scalp ..    Pt reported  her symptoms started with fever  5 days ago with Tmax 101.5.   She then  developed redness on her forehead. She took tylenol.   Pt denies N/V, SOB, chest pain.  Pt reports she last had a skin infection when she had shingles. (13 Sep 2019 20:12)        9/14: Redness all over the top of her forehead and down her eyes; as per the patient it is worse today compared to yesterday.    9/15: redness improving; patient wants to go home.                  *Fever 2ndry to Cellulitis of the upper face and scalp     - continue Vanco and rocephin#2  change abx to ceftin 500 mg PO q12h for 7 more days     - Blood cx and Urine Cx NGTD    - ID consult appreciated        *Microscopic Hematuria    - repeat ua as outpatient,  follow up with PCP        *DVT prophylaxis heparin        total time 55 minutes

## 2019-09-15 NOTE — DISCHARGE NOTE NURSING/CASE MANAGEMENT/SOCIAL WORK - PATIENT PORTAL LINK FT
You can access the FollowMyHealth Patient Portal offered by [facility] by registering at the following website: [email or web address]. By joining [employer]’s FollowMyHealth portal, you will also be able to view your health information using other applications (apps) compatible with our system.

## 2019-09-15 NOTE — PROGRESS NOTE ADULT - ASSESSMENT
83 y/o female with h/o shingles, arthritis, HTN not on meds was admitted on 9/13 for fever 101- 102F at home and scalp redness. She saw her PCP Dr. Pandey and was sent to the ED for further treatment. Pt reported  her symptoms started with fever  5 days ago with Tmax 101.5F. She then  developed redness on her forehead. In ER she was found febrile and received ceftriaxone, vancomycin IV and zosyn.    1. Febrile syndrome resolving. Frontal and anterior scalp cellulitis.  -no vesicular rash to suggest herpes zoster  -rash is improving  -BC x 2 reviewed  -on vancomycin 750 mg IV q12h and ceftriaxone 1 gm IV qd # 2  -tolerating abx well so far; no side effects noted  -may change abx to ceftin 500 mg PO q12h for 7 more days   -monitor temps  -f/u CBC  -supportive care  2. Other issues:   -care per medicine

## 2019-09-18 DIAGNOSIS — I10 ESSENTIAL (PRIMARY) HYPERTENSION: ICD-10-CM

## 2019-09-18 DIAGNOSIS — M19.90 UNSPECIFIED OSTEOARTHRITIS, UNSPECIFIED SITE: ICD-10-CM

## 2019-09-18 DIAGNOSIS — L03.211 CELLULITIS OF FACE: ICD-10-CM

## 2019-09-18 DIAGNOSIS — Z79.82 LONG TERM (CURRENT) USE OF ASPIRIN: ICD-10-CM

## 2019-09-18 DIAGNOSIS — L03.811 CELLULITIS OF HEAD [ANY PART, EXCEPT FACE]: ICD-10-CM

## 2019-09-18 DIAGNOSIS — R31.21 ASYMPTOMATIC MICROSCOPIC HEMATURIA: ICD-10-CM

## 2019-09-18 LAB
CULTURE RESULTS: SIGNIFICANT CHANGE UP
CULTURE RESULTS: SIGNIFICANT CHANGE UP
SPECIMEN SOURCE: SIGNIFICANT CHANGE UP
SPECIMEN SOURCE: SIGNIFICANT CHANGE UP

## 2021-07-22 NOTE — ED ADULT TRIAGE NOTE - STATUS:
Last seen 6/23/21    Last filled:  Seroquel 4/26/21 qty 90 w/ 1 refill  Glipizide 4/26/21 qty 180 w/ 1 refill   Lantus 6/28/21 qty 15ml w/ 3 refills  Amlodipine 10/25/20 qty 90 w/ 1 refill    Cancelled all prescription except Amlodipine due to patient already having refills on file at pharmacy.          Future Appointments   Date Time Provider Kapil Sky   9/7/2021  1:30 PM MINGO Williamson BS [facility]

## 2021-09-13 ENCOUNTER — RESULT REVIEW (OUTPATIENT)
Age: 86
End: 2021-09-13

## 2021-09-22 PROBLEM — Z00.00 ENCOUNTER FOR PREVENTIVE HEALTH EXAMINATION: Status: ACTIVE | Noted: 2021-09-22

## 2021-09-29 ENCOUNTER — NON-APPOINTMENT (OUTPATIENT)
Age: 86
End: 2021-09-29

## 2021-09-30 ENCOUNTER — APPOINTMENT (OUTPATIENT)
Dept: BREAST CENTER | Facility: CLINIC | Age: 86
End: 2021-09-30
Payer: MEDICARE

## 2021-09-30 VITALS
SYSTOLIC BLOOD PRESSURE: 151 MMHG | WEIGHT: 124 LBS | HEIGHT: 62.5 IN | HEART RATE: 84 BPM | DIASTOLIC BLOOD PRESSURE: 69 MMHG | BODY MASS INDEX: 22.25 KG/M2

## 2021-09-30 DIAGNOSIS — Z87.39 PERSONAL HISTORY OF OTHER DISEASES OF THE MUSCULOSKELETAL SYSTEM AND CONNECTIVE TISSUE: ICD-10-CM

## 2021-09-30 DIAGNOSIS — Z80.8 FAMILY HISTORY OF MALIGNANT NEOPLASM OF OTHER ORGANS OR SYSTEMS: ICD-10-CM

## 2021-09-30 DIAGNOSIS — Z86.39 PERSONAL HISTORY OF OTHER ENDOCRINE, NUTRITIONAL AND METABOLIC DISEASE: ICD-10-CM

## 2021-09-30 DIAGNOSIS — Z83.6 FAMILY HISTORY OF OTHER DISEASES OF THE RESPIRATORY SYSTEM: ICD-10-CM

## 2021-09-30 DIAGNOSIS — Z80.0 FAMILY HISTORY OF MALIGNANT NEOPLASM OF DIGESTIVE ORGANS: ICD-10-CM

## 2021-09-30 DIAGNOSIS — Z82.0 FAMILY HISTORY OF EPILEPSY AND OTHER DISEASES OF THE NERVOUS SYSTEM: ICD-10-CM

## 2021-09-30 DIAGNOSIS — Z86.19 PERSONAL HISTORY OF OTHER INFECTIOUS AND PARASITIC DISEASES: ICD-10-CM

## 2021-09-30 DIAGNOSIS — Z78.9 OTHER SPECIFIED HEALTH STATUS: ICD-10-CM

## 2021-09-30 DIAGNOSIS — Z82.49 FAMILY HISTORY OF ISCHEMIC HEART DISEASE AND OTHER DISEASES OF THE CIRCULATORY SYSTEM: ICD-10-CM

## 2021-09-30 DIAGNOSIS — Z82.3 FAMILY HISTORY OF STROKE: ICD-10-CM

## 2021-09-30 PROCEDURE — 99205 OFFICE O/P NEW HI 60 MIN: CPT

## 2021-09-30 PROCEDURE — G2212 PROLONG OUTPT/OFFICE VIS: CPT

## 2021-10-01 PROBLEM — Z78.9 DOES NOT USE ILLICIT DRUGS: Status: ACTIVE | Noted: 2021-09-30

## 2021-10-01 PROBLEM — Z80.8 FAMILY HISTORY OF MALIGNANT NEOPLASM OF BONE: Status: ACTIVE | Noted: 2021-09-30

## 2021-10-01 PROBLEM — Z83.6 FAMILY HISTORY OF PNEUMONIA: Status: ACTIVE | Noted: 2021-09-30

## 2021-10-01 PROBLEM — Z82.0 FAMILY HISTORY OF PARKINSON'S DISEASE: Status: ACTIVE | Noted: 2021-09-30

## 2021-10-01 PROBLEM — Z80.0 FAMILY HISTORY OF MALIGNANT NEOPLASM OF COLON: Status: ACTIVE | Noted: 2021-09-30

## 2021-10-01 PROBLEM — Z78.9 EXERCISES DAILY: Status: ACTIVE | Noted: 2021-09-30

## 2021-10-01 PROBLEM — Z82.49 FAMILY HISTORY OF AORTIC ANEURYSM: Status: ACTIVE | Noted: 2021-09-30

## 2021-10-01 PROBLEM — Z82.3 FAMILY HISTORY OF CEREBROVASCULAR ACCIDENT (CVA): Status: ACTIVE | Noted: 2021-09-30

## 2021-10-01 RX ORDER — HYDROCHLOROTHIAZIDE 25 MG/1
25 TABLET ORAL
Refills: 0 | Status: ACTIVE | COMMUNITY

## 2021-10-01 RX ORDER — MULTIVITAMIN
TABLET ORAL
Refills: 0 | Status: ACTIVE | COMMUNITY

## 2021-10-01 RX ORDER — ADHESIVE TAPE 3"X 2.3 YD
50 MCG TAPE, NON-MEDICATED TOPICAL
Refills: 0 | Status: ACTIVE | COMMUNITY

## 2021-10-01 RX ORDER — ASCORBIC ACID 500 MG
500 TABLET ORAL
Refills: 0 | Status: ACTIVE | COMMUNITY

## 2021-10-01 RX ORDER — MELATONIN 3 MG
3 CAPSULE ORAL
Refills: 0 | Status: ACTIVE | COMMUNITY

## 2021-10-01 RX ORDER — OMEGA-3/DHA/EPA/FISH OIL 300-1000MG
CAPSULE ORAL
Refills: 0 | Status: ACTIVE | COMMUNITY

## 2021-10-01 RX ORDER — MILK THISTLE 150 MG
CAPSULE ORAL
Refills: 0 | Status: ACTIVE | COMMUNITY

## 2021-10-01 RX ORDER — COLD-HOT PACK
EACH MISCELLANEOUS
Refills: 0 | Status: ACTIVE | COMMUNITY

## 2021-10-01 NOTE — PAST MEDICAL HISTORY
[Postmenopausal] : The patient is postmenopausal [Menarche Age ____] : age at menarche was [unfilled] [Menopause Age____] : age at menopause was [unfilled] [Total Preg ___] : G[unfilled] [Live Births ___] : P[unfilled]  [Age At Live Birth ___] : Age at live birth: [unfilled] [FreeTextEntry8] : 4 mo

## 2021-10-01 NOTE — HISTORY OF PRESENT ILLNESS
[FreeTextEntry1] : Ms. Fernández is an 86 year old woman who presents for a consultation for right breast DCIS. She is asymptomatic. No palpable breast or axillary lumps, nipple discharge, or skin changes. \par \par Her family history is not significant for any breast cancer.

## 2021-10-01 NOTE — CONSULT LETTER
[Dear  ___] : Dear  [unfilled], [Consult Letter:] : I had the pleasure of evaluating your patient, [unfilled]. [Please see my note below.] : Please see my note below. [Consult Closing:] : Thank you very much for allowing me to participate in the care of this patient.  If you have any questions, please do not hesitate to contact me. [Sincerely,] : Sincerely, [FreeTextEntry3] : Jocelin Canela MD FACS

## 2021-10-01 NOTE — DATA REVIEWED
[FreeTextEntry1] : B/l mammogram and US 8/12/21\par - heterogenously dense\par - calcifications in R UOQ\par - 0.3 cm nodule in R breast 11:00 N1, new\par - 0.2 cm calcified nodule in L breast 12:00 N5 with mammographic correlate\par - BIRADS 0\par \par R mammogram and US 9/2/21\par - calcifications in R UOQ are loose aggregate, punctate, round; 6 mo R mammogram\par - 0.4 x 0.4 x 0.5 cm irregular nodule in R breast 11:00 N1; US bx\par - BIRADS 4\par \par US bx 9/27/21\par - R breast 11:00 N1, coil clip = DCIS, low grade, ER 90%, AR 80%\par \par Breast MRI 9/27/21\par - bx clip in R  central breast with enhancing foci 1 cm anterior and 0.3 cm inferior to clip, and indeterminate enhancing focus 1.6 cm posterior to bx clip; wide excision\par - 3.5 cm linear enhancement in L  lateral slightly inferior breast; MR bx\par - 0.4 cm enhancing lesion in L breast 12-1:00 N5; MR bx\par - 0.5 cm linear enhancement in L retroareolar far posterior breast; unable to do MR bx due to location

## 2021-10-05 ENCOUNTER — RESULT REVIEW (OUTPATIENT)
Age: 86
End: 2021-10-05

## 2021-10-21 ENCOUNTER — OUTPATIENT (OUTPATIENT)
Dept: OUTPATIENT SERVICES | Facility: HOSPITAL | Age: 86
LOS: 1 days | End: 2021-10-21
Payer: MEDICARE

## 2021-10-21 ENCOUNTER — APPOINTMENT (OUTPATIENT)
Dept: MRI IMAGING | Facility: CLINIC | Age: 86
End: 2021-10-21
Payer: MEDICARE

## 2021-10-21 ENCOUNTER — NON-APPOINTMENT (OUTPATIENT)
Age: 86
End: 2021-10-21

## 2021-10-21 DIAGNOSIS — R92.8 OTHER ABNORMAL AND INCONCLUSIVE FINDINGS ON DIAGNOSTIC IMAGING OF BREAST: ICD-10-CM

## 2021-10-21 DIAGNOSIS — D05.11 INTRADUCTAL CARCINOMA IN SITU OF RIGHT BREAST: ICD-10-CM

## 2021-10-21 DIAGNOSIS — Z98.49 CATARACT EXTRACTION STATUS, UNSPECIFIED EYE: Chronic | ICD-10-CM

## 2021-10-21 PROCEDURE — 19085 BX BREAST 1ST LESION MR IMAG: CPT | Mod: 53,LT

## 2021-10-21 PROCEDURE — 77048 MRI BREAST C-+ W/CAD UNI: CPT | Mod: 26,LT

## 2021-10-21 PROCEDURE — A9585: CPT

## 2021-10-21 PROCEDURE — 19085 BX BREAST 1ST LESION MR IMAG: CPT

## 2021-11-02 ENCOUNTER — RESULT REVIEW (OUTPATIENT)
Age: 86
End: 2021-11-02

## 2021-11-02 ENCOUNTER — OUTPATIENT (OUTPATIENT)
Dept: OUTPATIENT SERVICES | Facility: HOSPITAL | Age: 86
LOS: 1 days | End: 2021-11-02
Payer: MEDICARE

## 2021-11-02 ENCOUNTER — APPOINTMENT (OUTPATIENT)
Dept: ULTRASOUND IMAGING | Facility: CLINIC | Age: 86
End: 2021-11-02
Payer: MEDICARE

## 2021-11-02 DIAGNOSIS — R92.8 OTHER ABNORMAL AND INCONCLUSIVE FINDINGS ON DIAGNOSTIC IMAGING OF BREAST: ICD-10-CM

## 2021-11-02 DIAGNOSIS — Z98.49 CATARACT EXTRACTION STATUS, UNSPECIFIED EYE: Chronic | ICD-10-CM

## 2021-11-02 PROCEDURE — 77065 DX MAMMO INCL CAD UNI: CPT | Mod: 26,LT

## 2021-11-02 PROCEDURE — 88342 IMHCHEM/IMCYTCHM 1ST ANTB: CPT | Mod: 26

## 2021-11-02 PROCEDURE — 88305 TISSUE EXAM BY PATHOLOGIST: CPT

## 2021-11-02 PROCEDURE — 77065 DX MAMMO INCL CAD UNI: CPT

## 2021-11-02 PROCEDURE — 88305 TISSUE EXAM BY PATHOLOGIST: CPT | Mod: 26

## 2021-11-02 PROCEDURE — A4648: CPT

## 2021-11-02 PROCEDURE — 19083 BX BREAST 1ST LESION US IMAG: CPT

## 2021-11-02 PROCEDURE — 19083 BX BREAST 1ST LESION US IMAG: CPT | Mod: LT

## 2021-11-02 PROCEDURE — 88342 IMHCHEM/IMCYTCHM 1ST ANTB: CPT

## 2021-11-11 ENCOUNTER — RESULT REVIEW (OUTPATIENT)
Age: 86
End: 2021-11-11

## 2021-11-11 ENCOUNTER — APPOINTMENT (OUTPATIENT)
Dept: MRI IMAGING | Facility: CLINIC | Age: 86
End: 2021-11-11
Payer: MEDICARE

## 2021-11-11 ENCOUNTER — OUTPATIENT (OUTPATIENT)
Dept: OUTPATIENT SERVICES | Facility: HOSPITAL | Age: 86
LOS: 1 days | End: 2021-11-11
Payer: MEDICARE

## 2021-11-11 DIAGNOSIS — R92.8 OTHER ABNORMAL AND INCONCLUSIVE FINDINGS ON DIAGNOSTIC IMAGING OF BREAST: ICD-10-CM

## 2021-11-11 DIAGNOSIS — Z98.49 CATARACT EXTRACTION STATUS, UNSPECIFIED EYE: Chronic | ICD-10-CM

## 2021-11-11 PROCEDURE — 19086 BX BREAST ADD LESION MR IMAG: CPT | Mod: LT

## 2021-11-11 PROCEDURE — 88305 TISSUE EXAM BY PATHOLOGIST: CPT

## 2021-11-11 PROCEDURE — 19086 BX BREAST ADD LESION MR IMAG: CPT

## 2021-11-11 PROCEDURE — 88305 TISSUE EXAM BY PATHOLOGIST: CPT | Mod: 26

## 2021-11-11 PROCEDURE — A9585: CPT

## 2021-11-11 PROCEDURE — 77065 DX MAMMO INCL CAD UNI: CPT | Mod: 26,LT

## 2021-11-11 PROCEDURE — 19085 BX BREAST 1ST LESION MR IMAG: CPT | Mod: LT

## 2021-11-11 PROCEDURE — 19085 BX BREAST 1ST LESION MR IMAG: CPT

## 2021-11-11 PROCEDURE — 77065 DX MAMMO INCL CAD UNI: CPT

## 2021-11-15 ENCOUNTER — NON-APPOINTMENT (OUTPATIENT)
Age: 86
End: 2021-11-15

## 2021-11-16 ENCOUNTER — NON-APPOINTMENT (OUTPATIENT)
Age: 86
End: 2021-11-16

## 2021-11-23 ENCOUNTER — NON-APPOINTMENT (OUTPATIENT)
Age: 86
End: 2021-11-23

## 2021-12-10 ENCOUNTER — OUTPATIENT (OUTPATIENT)
Dept: OUTPATIENT SERVICES | Facility: HOSPITAL | Age: 86
LOS: 1 days | Discharge: HOME | End: 2021-12-10

## 2021-12-10 DIAGNOSIS — Z98.49 CATARACT EXTRACTION STATUS, UNSPECIFIED EYE: Chronic | ICD-10-CM

## 2021-12-10 DIAGNOSIS — Z15.89 GENETIC SUSCEPTIBILITY TO OTHER DISEASE: ICD-10-CM

## 2021-12-13 ENCOUNTER — APPOINTMENT (OUTPATIENT)
Dept: HEMATOLOGY ONCOLOGY | Facility: CLINIC | Age: 86
End: 2021-12-13
Payer: MEDICARE

## 2021-12-13 PROCEDURE — 99203 OFFICE O/P NEW LOW 30 MIN: CPT | Mod: 95

## 2021-12-17 ENCOUNTER — APPOINTMENT (OUTPATIENT)
Dept: MAMMOGRAPHY | Facility: CLINIC | Age: 86
End: 2021-12-17
Payer: MEDICARE

## 2021-12-17 ENCOUNTER — OUTPATIENT (OUTPATIENT)
Dept: OUTPATIENT SERVICES | Facility: HOSPITAL | Age: 86
LOS: 1 days | End: 2021-12-17
Payer: MEDICARE

## 2021-12-17 DIAGNOSIS — D05.11 INTRADUCTAL CARCINOMA IN SITU OF RIGHT BREAST: ICD-10-CM

## 2021-12-17 DIAGNOSIS — Z98.49 CATARACT EXTRACTION STATUS, UNSPECIFIED EYE: Chronic | ICD-10-CM

## 2021-12-17 PROCEDURE — 19281 PERQ DEVICE BREAST 1ST IMAG: CPT | Mod: RT

## 2021-12-17 PROCEDURE — C1739: CPT

## 2021-12-17 PROCEDURE — 19281 PERQ DEVICE BREAST 1ST IMAG: CPT

## 2021-12-22 ENCOUNTER — OUTPATIENT (OUTPATIENT)
Dept: OUTPATIENT SERVICES | Facility: HOSPITAL | Age: 86
LOS: 1 days | End: 2021-12-22
Payer: MEDICARE

## 2021-12-22 DIAGNOSIS — D05.11 INTRADUCTAL CARCINOMA IN SITU OF RIGHT BREAST: ICD-10-CM

## 2021-12-22 DIAGNOSIS — Z96.641 PRESENCE OF RIGHT ARTIFICIAL HIP JOINT: Chronic | ICD-10-CM

## 2021-12-22 DIAGNOSIS — Z98.49 CATARACT EXTRACTION STATUS, UNSPECIFIED EYE: Chronic | ICD-10-CM

## 2021-12-22 DIAGNOSIS — Z01.818 ENCOUNTER FOR OTHER PREPROCEDURAL EXAMINATION: ICD-10-CM

## 2021-12-22 LAB
ALBUMIN SERPL ELPH-MCNC: 3.5 G/DL — SIGNIFICANT CHANGE UP (ref 3.3–5)
ALP SERPL-CCNC: 54 U/L — SIGNIFICANT CHANGE UP (ref 40–120)
ALT FLD-CCNC: 25 U/L — SIGNIFICANT CHANGE UP (ref 12–78)
ANION GAP SERPL CALC-SCNC: 5 MMOL/L — SIGNIFICANT CHANGE UP (ref 5–17)
AST SERPL-CCNC: 19 U/L — SIGNIFICANT CHANGE UP (ref 15–37)
BASOPHILS # BLD AUTO: 0.04 K/UL — SIGNIFICANT CHANGE UP (ref 0–0.2)
BASOPHILS NFR BLD AUTO: 0.9 % — SIGNIFICANT CHANGE UP (ref 0–2)
BILIRUB SERPL-MCNC: 0.4 MG/DL — SIGNIFICANT CHANGE UP (ref 0.2–1.2)
BUN SERPL-MCNC: 12 MG/DL — SIGNIFICANT CHANGE UP (ref 7–23)
CALCIUM SERPL-MCNC: 9.5 MG/DL — SIGNIFICANT CHANGE UP (ref 8.5–10.1)
CHLORIDE SERPL-SCNC: 107 MMOL/L — SIGNIFICANT CHANGE UP (ref 96–108)
CO2 SERPL-SCNC: 30 MMOL/L — SIGNIFICANT CHANGE UP (ref 22–31)
CREAT SERPL-MCNC: 0.52 MG/DL — SIGNIFICANT CHANGE UP (ref 0.5–1.3)
EOSINOPHIL # BLD AUTO: 0.08 K/UL — SIGNIFICANT CHANGE UP (ref 0–0.5)
EOSINOPHIL NFR BLD AUTO: 1.9 % — SIGNIFICANT CHANGE UP (ref 0–6)
GLUCOSE SERPL-MCNC: 111 MG/DL — HIGH (ref 70–99)
HCT VFR BLD CALC: 43.2 % — SIGNIFICANT CHANGE UP (ref 34.5–45)
HGB BLD-MCNC: 14.2 G/DL — SIGNIFICANT CHANGE UP (ref 11.5–15.5)
IMM GRANULOCYTES NFR BLD AUTO: 0.2 % — SIGNIFICANT CHANGE UP (ref 0–1.5)
LYMPHOCYTES # BLD AUTO: 1.27 K/UL — SIGNIFICANT CHANGE UP (ref 1–3.3)
LYMPHOCYTES # BLD AUTO: 29.5 % — SIGNIFICANT CHANGE UP (ref 13–44)
MCHC RBC-ENTMCNC: 29.9 PG — SIGNIFICANT CHANGE UP (ref 27–34)
MCHC RBC-ENTMCNC: 32.9 GM/DL — SIGNIFICANT CHANGE UP (ref 32–36)
MCV RBC AUTO: 90.9 FL — SIGNIFICANT CHANGE UP (ref 80–100)
MONOCYTES # BLD AUTO: 0.35 K/UL — SIGNIFICANT CHANGE UP (ref 0–0.9)
MONOCYTES NFR BLD AUTO: 8.1 % — SIGNIFICANT CHANGE UP (ref 2–14)
NEUTROPHILS # BLD AUTO: 2.55 K/UL — SIGNIFICANT CHANGE UP (ref 1.8–7.4)
NEUTROPHILS NFR BLD AUTO: 59.4 % — SIGNIFICANT CHANGE UP (ref 43–77)
PLATELET # BLD AUTO: 203 K/UL — SIGNIFICANT CHANGE UP (ref 150–400)
POTASSIUM SERPL-MCNC: 3.5 MMOL/L — SIGNIFICANT CHANGE UP (ref 3.5–5.3)
POTASSIUM SERPL-SCNC: 3.5 MMOL/L — SIGNIFICANT CHANGE UP (ref 3.5–5.3)
PROT SERPL-MCNC: 7.4 GM/DL — SIGNIFICANT CHANGE UP (ref 6–8.3)
RBC # BLD: 4.75 M/UL — SIGNIFICANT CHANGE UP (ref 3.8–5.2)
RBC # FLD: 13.4 % — SIGNIFICANT CHANGE UP (ref 10.3–14.5)
SODIUM SERPL-SCNC: 142 MMOL/L — SIGNIFICANT CHANGE UP (ref 135–145)
WBC # BLD: 4.3 K/UL — SIGNIFICANT CHANGE UP (ref 3.8–10.5)
WBC # FLD AUTO: 4.3 K/UL — SIGNIFICANT CHANGE UP (ref 3.8–10.5)

## 2021-12-22 PROCEDURE — 93010 ELECTROCARDIOGRAM REPORT: CPT

## 2021-12-22 PROCEDURE — 93005 ELECTROCARDIOGRAM TRACING: CPT

## 2021-12-22 PROCEDURE — 85025 COMPLETE CBC W/AUTO DIFF WBC: CPT

## 2021-12-22 PROCEDURE — 36415 COLL VENOUS BLD VENIPUNCTURE: CPT

## 2021-12-22 PROCEDURE — 80053 COMPREHEN METABOLIC PANEL: CPT

## 2021-12-22 NOTE — PHYSICAL EXAM
[Normocephalic] : normocephalic [Atraumatic] : atraumatic [EOMI] : extra ocular movement intact [Sclera nonicteric] : sclera nonicteric [Supple] : supple [No Supraclavicular Adenopathy] : no supraclavicular adenopathy [Clear to Auscultation Bilat] : clear to auscultation bilaterally [Normal Sinus Rhythm] : normal sinus rhythm [No Rubs] : no pericardial rub [Examined in the supine and seated position] : examined in the supine and seated position [Symmetrical] : symmetrical [Bra Size: ___] : Bra Size: [unfilled] [No dominant masses] : no dominant masses in right breast  [No dominant masses] : no dominant masses left breast [No Nipple Retraction] : no left nipple retraction [No Nipple Discharge] : no left nipple discharge [No Axillary Lymphadenopathy] : no left axillary lymphadenopathy [Soft] : abdomen soft [Not Tender] : non-tender [No Edema] : no edema [No Rashes] : no rashes [No Ulceration] : no ulceration

## 2021-12-22 NOTE — DATA REVIEWED
[FreeTextEntry1] : B/l mammogram and US 8/12/21\par - heterogenously dense\par - calcifications in R UOQ\par - 0.3 cm nodule in R breast 11:00 N1, new\par - 0.2 cm calcified nodule in L breast 12:00 N5 with mammographic correlate\par - BIRADS 0\par \par R mammogram and US 9/2/21\par - calcifications in R UOQ are loose aggregate, punctate, round; 6 mo R mammogram\par - 0.4 x 0.4 x 0.5 cm irregular nodule in R breast 11:00 N1; US bx\par - BIRADS 4\par \par US bx 9/13/21\par - R breast 11:00 N1, coil clip = DCIS, low grade, ER 90%, MN 80%\par \par Breast MRI 9/27/21\par - bx clip in R  central breast with enhancing foci 1 cm anterior and 0.3 cm inferior to clip, and indeterminate enhancing focus 1.6 cm posterior to bx clip; wide excision\par - 3.5 cm linear enhancement in L  lateral slightly inferior breast; MR bx\par - 0.4 cm enhancing lesion in L breast 12-1:00 N5; MR bx\par - 0.5 cm linear enhancement in L retroareolar far posterior breast; unable to do MR bx due to location\par \par Attempted MR bx on 10/21/21, but patient did not tolerate positioning.\par \par US and US needle bx 11/4/21\par - 0.5 x 0.2 x 0.3 cm nodule in L breast 1:00 N4; possible correlate to MR finding in L breast 12-1:00; needle bx, coil clip = ductal hyperplasia, stromal fibrosis; concordant; 1 year US\par - no sonographic correlate for enhancement in L lower lateral breast; MR bx\par \par MR needle bx 11/11/21\par - L LOQ, cork clip is 4 cm medially displaced = fatty tissue, stromal fibrosis\par - L 12-1:00 posterior, stoplight clip is 4 cm medially displaced = fatty tissue, stromal fibrosis\par - concordant; 1 yr MRI

## 2021-12-22 NOTE — ASU PATIENT PROFILE, ADULT - NSICDXPASTSURGICALHX_GEN_ALL_CORE_FT
PAST SURGICAL HISTORY:  S/P cataract extraction, unspecified laterality bilateral    S/P hip replacement, right

## 2021-12-22 NOTE — HISTORY OF PRESENT ILLNESS
[FreeTextEntry1] : Ms. Fernández is an 86 year old woman here for surgical treatment of a right breast DCIS. She is asymptomatic. No palpable breast or axillary lumps, nipple discharge, or skin changes. 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 Her family history is not significant for any breast cancer.

## 2021-12-22 NOTE — CHART NOTE - NSCHARTNOTEFT_GEN_A_CORE
87 y/o female seen in PST for scheduled for right lumpectomy on 1/3/21    vs -150/ 80 hr 82 rr 16 temp 98.2    cbc, cmp, done today in PST       Ductal Carcinoma   Pre op and chlorhexidine instructions given and explained.  Avoid NSAIDs and OTC supplements.   Patient verbalized understanding  medical consult requested by surgeon 12/27/21  covid 19 swab advised to call to schedule, advised to quarantine after test

## 2021-12-22 NOTE — ASU PATIENT PROFILE, ADULT - NSICDXPASTMEDICALHX_GEN_ALL_CORE_FT
PAST MEDICAL HISTORY:  Arthritis     Hepatitis B resolved    Hip pain, acute, right     Hypertension     Shingles      PAST MEDICAL HISTORY:  Arthritis     Ductal carcinoma of breast right breast    Hepatitis B resolved    Hip pain, acute, right     Hypertension     Shingles

## 2021-12-23 DIAGNOSIS — Z01.818 ENCOUNTER FOR OTHER PREPROCEDURAL EXAMINATION: ICD-10-CM

## 2021-12-23 DIAGNOSIS — D05.11 INTRADUCTAL CARCINOMA IN SITU OF RIGHT BREAST: ICD-10-CM

## 2021-12-27 NOTE — ASSESSMENT
[FreeTextEntry1] : The visit was provided via telehealth using real-time 2-way audio visual technology. The patient, Raquel Fernández, and the genetic counselor, Rafael Tejeda, were located at the medical office in [place], [place] at the time of the visit. The physician, Dr. Emmett Hamm, was located in [place], [place]. The patient, Raquel Fernández, and both providers participated in the telehealth encounter. Verbal consent for telehealth services was given on 2021 by the patient, Raquel Fernández. \par \par REASON FOR CONSULT\par Raquel Fernández is an 86-year-old female who was referred by Dr. 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 \par \par RELEVANT MEDICAL HISTORY\par Ms. Fernández was diagnosed with right breast cancer (DCIS, ER+/HI+) on 2021 at age 86. She is scheduled for a lumpectomy on 2021.   \par \par Ms. 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 This test was ordered by Dr. Canela and Jenny Salinas (PA) and reported on 10/21/2021. \par \par OTHER MEDICAL AND SURGICAL HISTORY:\par HTN. Hip surgery (2016). Shingles. Cataract surgery (10-15 years ago). Left breast biopsy x2, predominantly fatty tissue with patchy benign breast tissue and stromal fibrosis (2021). Left breast biopsy, ductal hyperplasia (2021). \par \par PAST OB/GYN HISTORY:\par Obstetrical History: \par Age at Menarche: 17\par Menopausal with LMP at age 52\par Age at First Live Birth: 36\par Oral Contraceptive Use: No\par Hormone Replacement Therapy: No\par \par CANCER SCREENING HISTORY:  \par GYN: Last visit 20 years ago, normal. \par Colon: No colonoscopies. Last fecal occult blood test 8 years ago, normal. Frequency: previously yearly\par Skin: Last exam 4-5 years ago, no biopsies reported. No concerns reported today.\par \par SOCIAL HISTORY:\par •	\par •	’s caretaker\par •	Tobacco-product use: No\par \par FAMILY HISTORY:\par Maternal ancestry was reported as [place] and paternal ancestry was reported as [place]. A detailed family history of cancer was ascertained, see below and scanned chart for pedigree. \par \par To Ms. Fernández’s knowledge, no one else in the family has had germline testing for cancer susceptibility. \par \par Of note, Ms. Fernández reported no personal or family history of NF1-related features (i.e. café-au-lait macules, inguinal/axillary freckling, neurofibromas, learning disability, Lisch nodules, etc…).  \par \par RESULTS INTERPRETATION AND ASSESSMENT:\par We reviewed with Ms. 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 This variant was detected at an allele frequency of 5-15%. Of note, Ms. Fernández reported she has always had normal CBCs yearly with her PCP. It is unclear at this time if Ms. Fernández has a truly mosaic NF1 mutation or if this mosaic NF1 is a result of clonal hematopoiesis of indeterminate potential (CHIP) or an underlying pre-leukemic or leukemic state. Typically, our next step would be testing an additional sample type via skin biopsy however given her personal and family history, we discussed the clinical utility as it related to her follow-up care was minimal. 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 CHIP refers to a genetically distinct subpopulation of blood cells that share an acquired mutation, these mutations are not inherited. The prevalence of CHIP increases with age and exposure to agents such as chemotherapy and radiation therapy. We discussed that CHIP is a diagnosis of exclusion and there is no one test to establish its’ diagnosis. In this setting, we recommend that a Hematologist be seen for a complete workup to rule out an underlying premalignant or malignant hematologic cause for this result. Ms. Fernández understood the pros and cons of pursuing a skin biopsy at this time and decided against additional testing at this point. We therefore recommended she see a Hematologist for a workup in the next 3 months or so. \par \par IMPLICATIONS FOR THE PATIENT:\par Given Ms. 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 Fernández’s on- or post-treatment protocol as recommended by her care team.\par \par CHIP/HEMATOLOGY\par - Given Ms. Fernández decided not to pursue a skin biopsy at this point, we recommended she follow-up with a Hematologist for a complete workup to determine if he has CHIP or an underlying premalignant or malignant hematologic cause for this result. Per patient request, referral provided.  \par \par OTHER:\par - In the absence of other indications, Ms. Fernández should practice age-appropriate cancer screening of other organ systems as recommended for the general population upon discussion with her care team.\par \par IMPLICATIONS FOR FAMILY MEMBERS:\par We recommended Ms. Fernández’s children pursue genetic testing for NF1 as there may be up to a 50% chance that they have the same gene mutation if the NF1 mutation is truly mosaic in Ms. Fernández. She understood the implications and will inform her children when they come to visit during the holidays. Ms. Fernández was made aware that if they wanted to pursue genetic testing any time in the future, we would be happy to see them and coordinate testing. If they are not local, they can locate a genetic counselor using the National Society of Genetic Counselors, Find a Genetic Counselor Tool (www.nsgc.org/findageneticcounselor). \par \par PLAN:\par 1.	See above note for recommended management. Given Ms. Fernández is not pursuing a skin biopsy, we recommend he see a Hematologist for a full workup. \par 2.	We encouraged sharing these results with her children. They have a risk to have inherited the same mutation as we cannot entirely rule out constitutional mosaicism. \par 3.	Family support resources and referrals were provided. \par 4.	Copy of report was given during the visit. Clinic note will be mailed to Ms. Fernández. \par 5.	Genetic knowledge changes rapidly. We encouraged re-contacting Cancer Genetics every 2-3 years for any changes in screening recommendations or sooner if there are significant changes in personal or family history\par \par For any additional questions please call Cancer Genetics at [phone]. \par \par \par Rafael Tejeda, MS, [facility]\par Genetic Counselor, Cancer Genetics\par \par \par Attending Attestation:\par \par I have reviewed and edited the genetic counselor's note and I agree with the assessment and plan as documented. I spent approximately 30 minutes in total time of which approximately 15 minutes was face-to-face (via 2-way audiovisual telemedicine connection) with Ms. Fernández reviewing her relevant personal and family history, the genetic testing results, our risk-assessment, and options for future cancer risk-reduction for the patient and her relevant family members. Over half this time was spent in counseling and coordination of care.\par \par Emmett Hamm MD\par Chief, Cancer Genetics\par [facility] [place]\par \par \par CC: \par Patient\par Dr. Jocelin Canela

## 2021-12-28 ENCOUNTER — OUTPATIENT (OUTPATIENT)
Dept: OUTPATIENT SERVICES | Facility: HOSPITAL | Age: 86
LOS: 1 days | End: 2021-12-28
Payer: MEDICARE

## 2021-12-28 ENCOUNTER — RESULT REVIEW (OUTPATIENT)
Age: 86
End: 2021-12-28

## 2021-12-28 DIAGNOSIS — Z96.641 PRESENCE OF RIGHT ARTIFICIAL HIP JOINT: Chronic | ICD-10-CM

## 2021-12-28 DIAGNOSIS — Z98.49 CATARACT EXTRACTION STATUS, UNSPECIFIED EYE: Chronic | ICD-10-CM

## 2021-12-28 DIAGNOSIS — D05.11 INTRADUCTAL CARCINOMA IN SITU OF RIGHT BREAST: ICD-10-CM

## 2021-12-28 PROBLEM — C50.919 MALIGNANT NEOPLASM OF UNSPECIFIED SITE OF UNSPECIFIED FEMALE BREAST: Chronic | Status: ACTIVE | Noted: 2021-12-22

## 2021-12-28 PROCEDURE — 88321 CONSLTJ&REPRT SLD PREP ELSWR: CPT

## 2021-12-29 DIAGNOSIS — D05.11 INTRADUCTAL CARCINOMA IN SITU OF RIGHT BREAST: ICD-10-CM

## 2022-01-01 RX ORDER — ONDANSETRON 8 MG/1
4 TABLET, FILM COATED ORAL ONCE
Refills: 0 | Status: DISCONTINUED | OUTPATIENT
Start: 2022-01-03 | End: 2022-01-03

## 2022-01-01 RX ORDER — FENTANYL CITRATE 50 UG/ML
25 INJECTION INTRAVENOUS
Refills: 0 | Status: DISCONTINUED | OUTPATIENT
Start: 2022-01-03 | End: 2022-01-03

## 2022-01-01 RX ORDER — SODIUM CHLORIDE 9 MG/ML
1000 INJECTION INTRAMUSCULAR; INTRAVENOUS; SUBCUTANEOUS
Refills: 0 | Status: DISCONTINUED | OUTPATIENT
Start: 2022-01-03 | End: 2022-01-03

## 2022-01-01 RX ORDER — OXYCODONE HYDROCHLORIDE 5 MG/1
5 TABLET ORAL ONCE
Refills: 0 | Status: DISCONTINUED | OUTPATIENT
Start: 2022-01-03 | End: 2022-01-03

## 2022-01-02 ENCOUNTER — NON-APPOINTMENT (OUTPATIENT)
Age: 87
End: 2022-01-02

## 2022-01-03 ENCOUNTER — RESULT REVIEW (OUTPATIENT)
Age: 87
End: 2022-01-03

## 2022-01-03 ENCOUNTER — OUTPATIENT (OUTPATIENT)
Dept: INPATIENT UNIT | Facility: HOSPITAL | Age: 87
LOS: 1 days | Discharge: HOME | End: 2022-01-03
Payer: MEDICARE

## 2022-01-03 ENCOUNTER — APPOINTMENT (OUTPATIENT)
Dept: BREAST CENTER | Facility: HOSPITAL | Age: 87
End: 2022-01-03

## 2022-01-03 VITALS
HEIGHT: 62 IN | SYSTOLIC BLOOD PRESSURE: 150 MMHG | TEMPERATURE: 98 F | DIASTOLIC BLOOD PRESSURE: 70 MMHG | WEIGHT: 121.03 LBS | OXYGEN SATURATION: 99 % | HEART RATE: 75 BPM | RESPIRATION RATE: 16 BRPM

## 2022-01-03 VITALS
SYSTOLIC BLOOD PRESSURE: 158 MMHG | RESPIRATION RATE: 15 BRPM | OXYGEN SATURATION: 100 % | TEMPERATURE: 97 F | HEART RATE: 73 BPM | DIASTOLIC BLOOD PRESSURE: 73 MMHG

## 2022-01-03 DIAGNOSIS — Z98.49 CATARACT EXTRACTION STATUS, UNSPECIFIED EYE: Chronic | ICD-10-CM

## 2022-01-03 DIAGNOSIS — Z96.641 PRESENCE OF RIGHT ARTIFICIAL HIP JOINT: Chronic | ICD-10-CM

## 2022-01-03 DIAGNOSIS — Z96.641 PRESENCE OF RIGHT ARTIFICIAL HIP JOINT: ICD-10-CM

## 2022-01-03 DIAGNOSIS — D05.11 INTRADUCTAL CARCINOMA IN SITU OF RIGHT BREAST: ICD-10-CM

## 2022-01-03 DIAGNOSIS — M25.551 PAIN IN RIGHT HIP: ICD-10-CM

## 2022-01-03 DIAGNOSIS — Z87.39 PERSONAL HISTORY OF OTHER DISEASES OF THE MUSCULOSKELETAL SYSTEM AND CONNECTIVE TISSUE: ICD-10-CM

## 2022-01-03 DIAGNOSIS — M19.90 UNSPECIFIED OSTEOARTHRITIS, UNSPECIFIED SITE: ICD-10-CM

## 2022-01-03 DIAGNOSIS — I10 ESSENTIAL (PRIMARY) HYPERTENSION: ICD-10-CM

## 2022-01-03 PROCEDURE — 88341 IMHCHEM/IMCYTCHM EA ADD ANTB: CPT | Mod: 26

## 2022-01-03 PROCEDURE — 88307 TISSUE EXAM BY PATHOLOGIST: CPT

## 2022-01-03 PROCEDURE — 88305 TISSUE EXAM BY PATHOLOGIST: CPT | Mod: 26

## 2022-01-03 PROCEDURE — 19301 PARTIAL MASTECTOMY: CPT | Mod: RT

## 2022-01-03 PROCEDURE — 76098 X-RAY EXAM SURGICAL SPECIMEN: CPT

## 2022-01-03 PROCEDURE — 88341 IMHCHEM/IMCYTCHM EA ADD ANTB: CPT

## 2022-01-03 PROCEDURE — 88342 IMHCHEM/IMCYTCHM 1ST ANTB: CPT | Mod: 26

## 2022-01-03 PROCEDURE — 88305 TISSUE EXAM BY PATHOLOGIST: CPT

## 2022-01-03 PROCEDURE — 88342 IMHCHEM/IMCYTCHM 1ST ANTB: CPT

## 2022-01-03 PROCEDURE — 76098 X-RAY EXAM SURGICAL SPECIMEN: CPT | Mod: 26

## 2022-01-03 PROCEDURE — 88307 TISSUE EXAM BY PATHOLOGIST: CPT | Mod: 26

## 2022-01-03 RX ORDER — OXYCODONE HYDROCHLORIDE 5 MG/1
1 TABLET ORAL
Qty: 5 | Refills: 0
Start: 2022-01-03

## 2022-01-03 RX ORDER — FAMOTIDINE 10 MG/ML
20 INJECTION INTRAVENOUS ONCE
Refills: 0 | Status: COMPLETED | OUTPATIENT
Start: 2022-01-03 | End: 2022-01-03

## 2022-01-03 RX ORDER — CELECOXIB 200 MG/1
200 CAPSULE ORAL ONCE
Refills: 0 | Status: COMPLETED | OUTPATIENT
Start: 2022-01-03 | End: 2022-01-03

## 2022-01-03 RX ORDER — ACETAMINOPHEN 500 MG
975 TABLET ORAL ONCE
Refills: 0 | Status: COMPLETED | OUTPATIENT
Start: 2022-01-03 | End: 2022-01-03

## 2022-01-03 RX ADMIN — CELECOXIB 200 MILLIGRAM(S): 200 CAPSULE ORAL at 06:49

## 2022-01-03 RX ADMIN — FAMOTIDINE 20 MILLIGRAM(S): 10 INJECTION INTRAVENOUS at 06:49

## 2022-01-03 RX ADMIN — Medication 975 MILLIGRAM(S): at 06:49

## 2022-01-03 RX ADMIN — Medication 975 MILLIGRAM(S): at 06:51

## 2022-01-03 RX ADMIN — CELECOXIB 200 MILLIGRAM(S): 200 CAPSULE ORAL at 06:51

## 2022-01-03 NOTE — ASU DISCHARGE PLAN (ADULT/PEDIATRIC) - NS MD DC FALL RISK RISK
For information on Fall & Injury Prevention, visit: [email or web address] OR  [email or web address] OR  https://www.cdc.gov/steadi/patient.html

## 2022-01-03 NOTE — ASU DISCHARGE PLAN (ADULT/PEDIATRIC) - CARE PROVIDER_API CALL
Jocelin Canela)  Surgery  [facility], [address] [place] [address]  Phone: [phone]  Fax: [phone]  Follow Up Time:

## 2022-01-03 NOTE — BRIEF OPERATIVE NOTE - NSICDXBRIEFPREOP_GEN_ALL_CORE_FT
PRE-OP DIAGNOSIS:  Ductal carcinoma in situ (DCIS) of right breast 03-Jan-2022 09:16:34  Jocelin Canela

## 2022-01-03 NOTE — ASU PATIENT PROFILE, ADULT - NSICDXPASTMEDICALHX_GEN_ALL_CORE_FT
PAST MEDICAL HISTORY:  Arthritis     Ductal carcinoma of breast right breast    Hepatitis B resolved    Hip pain, acute, right     Hypertension     Shingles

## 2022-01-03 NOTE — BRIEF OPERATIVE NOTE - NSICDXBRIEFPOSTOP_GEN_ALL_CORE_FT
POST-OP DIAGNOSIS:  Ductal carcinoma in situ (DCIS) of right breast 03-Jan-2022 09:16:41  Jocelin Canela

## 2022-01-03 NOTE — ASU PATIENT PROFILE, ADULT - FALL HARM RISK - UNIVERSAL INTERVENTIONS
Bed in lowest position, wheels locked, appropriate side rails in place/Call bell, personal items and telephone in reach/Instruct patient to call for assistance before getting out of bed or chair/Non-slip footwear when patient is out of bed/[place] to call system/Physically safe environment - no spills, clutter or unnecessary equipment/Purposeful Proactive Rounding/Room/bathroom lighting operational, light cord in reach

## 2022-01-04 ENCOUNTER — NON-APPOINTMENT (OUTPATIENT)
Age: 87
End: 2022-01-04

## 2022-01-18 ENCOUNTER — APPOINTMENT (OUTPATIENT)
Dept: BREAST CENTER | Facility: CLINIC | Age: 87
End: 2022-01-18
Payer: MEDICARE

## 2022-01-18 VITALS
WEIGHT: 125 LBS | SYSTOLIC BLOOD PRESSURE: 161 MMHG | DIASTOLIC BLOOD PRESSURE: 68 MMHG | HEIGHT: 62.5 IN | HEART RATE: 76 BPM | BODY MASS INDEX: 22.43 KG/M2

## 2022-01-18 PROCEDURE — 99024 POSTOP FOLLOW-UP VISIT: CPT

## 2022-01-18 NOTE — HISTORY OF PRESENT ILLNESS
[FreeTextEntry1] : Ms. Fernández is an 86 year old woman here for a postop visit s/p lumpectomy for a right breast DCIS. She is doing well postoperatively. No complaints. 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 Her family history is not significant for any breast cancer.

## 2022-01-18 NOTE — CONSULT LETTER
[Dear  ___] : Dear  [unfilled], [Courtesy Letter:] : I had the pleasure of seeing your patient, [unfilled], in my office today. [Please see my note below.] : Please see my note below. [Consult Closing:] : Thank you very much for allowing me to participate in the care of this patient.  If you have any questions, please do not hesitate to contact me. [Sincerely,] : Sincerely, [FreeTextEntry3] : Jocelin Canela MD FACS  [DrEvelyn  ___] : Dr. TAMEZ

## 2022-01-18 NOTE — PHYSICAL EXAM
[Normocephalic] : normocephalic [Atraumatic] : atraumatic [EOMI] : extra ocular movement intact [Sclera nonicteric] : sclera nonicteric [Examined in the supine and seated position] : examined in the supine and seated position [Symmetrical] : symmetrical [Bra Size: ___] : Bra Size: [unfilled] [No dominant masses] : no dominant masses in right breast  [No dominant masses] : no dominant masses left breast [No Nipple Retraction] : no left nipple retraction [No Nipple Discharge] : no left nipple discharge [No Edema] : no edema [No Rashes] : no rashes [No Ulceration] : no ulceration [de-identified] : Superior periareolar incision clean and intact

## 2022-01-18 NOTE — DATA REVIEWED
[FreeTextEntry1] : B/l mammogram and US 8/12/21\par - heterogenously dense\par - calcifications in R UOQ\par - 0.3 cm nodule in R breast 11:00 N1, new\par - 0.2 cm calcified nodule in L breast 12:00 N5 with mammographic correlate\par - BIRADS 0\par \par R mammogram and US 9/2/21\par - calcifications in R UOQ are loose aggregate, punctate, round; 6 mo R mammogram\par - 0.4 x 0.4 x 0.5 cm irregular nodule in R breast 11:00 N1; US bx\par - BIRADS 4\par \par US bx 9/13/21\par - R breast 11:00 N1, coil clip = DCIS, low grade, ER 90%, ND 80%\par \par Breast MRI 9/27/21\par - bx clip in R  central breast with enhancing foci 1 cm anterior and 0.3 cm inferior to clip, and indeterminate enhancing focus 1.6 cm posterior to bx clip; wide excision\par - 3.5 cm linear enhancement in L  lateral slightly inferior breast; MR bx\par - 0.4 cm enhancing lesion in L breast 12-1:00 N5; MR bx\par - 0.5 cm linear enhancement in L retroareolar far posterior breast; unable to do MR bx due to location\par \par Attempted MR bx on 10/21/21, but patient did not tolerate positioning.\par \par US and US needle bx 11/4/21\par - 0.5 x 0.2 x 0.3 cm nodule in L breast 1:00 N4; possible correlate to MR finding in L breast 12-1:00; needle bx, coil clip = ductal hyperplasia, stromal fibrosis; concordant; 1 year US\par - no sonographic correlate for enhancement in L lower lateral breast; MR bx\par \par MR needle bx 11/11/21\par - L LOQ, cork clip is 4 cm medially displaced = fatty tissue, stromal fibrosis\par - L 12-1:00 posterior, stoplight clip is 4 cm medially displaced = fatty tissue, stromal fibrosis\par - concordant; 1 yr MRI\par \par Surgical pathology 1/3/22\par - R lumpectomy = DCIS, 0.5 cm

## 2022-01-18 NOTE — PAST MEDICAL HISTORY
[Postmenopausal] : The patient is postmenopausal [Menarche Age ____] : age at menarche was [unfilled] [Menopause Age____] : age at menopause was [unfilled] [Total Preg ___] : G[unfilled] [Live Births ___] : P[unfilled]  [Age At Live Birth ___] : Age at live birth: [unfilled] [FreeTextEntry8] : 4 mo Xenograft Text: The defect edges were debeveled with a #15 scalpel blade.  Given the location of the defect, shape of the defect and the proximity to free margins a xenograft was deemed most appropriate.  The graft was then trimmed to fit the size of the defect.  The graft was then placed in the primary defect and oriented appropriately.

## 2022-01-27 ENCOUNTER — APPOINTMENT (OUTPATIENT)
Dept: RADIATION ONCOLOGY | Facility: CLINIC | Age: 87
End: 2022-01-27
Payer: MEDICARE

## 2022-01-27 VITALS
BODY MASS INDEX: 21.71 KG/M2 | WEIGHT: 121 LBS | HEART RATE: 79 BPM | OXYGEN SATURATION: 96 % | SYSTOLIC BLOOD PRESSURE: 148 MMHG | HEIGHT: 62.5 IN | RESPIRATION RATE: 18 BRPM | DIASTOLIC BLOOD PRESSURE: 77 MMHG

## 2022-01-27 PROCEDURE — 99204 OFFICE O/P NEW MOD 45 MIN: CPT | Mod: 25

## 2022-01-27 RX ORDER — DIAZEPAM 5 MG/1
5 TABLET ORAL
Qty: 2 | Refills: 0 | Status: DISCONTINUED | COMMUNITY
Start: 2021-11-04 | End: 2022-01-27

## 2022-01-27 NOTE — DISEASE MANAGEMENT
[Pathological] : TNM Stage: p [0] : 0 [FreeTextEntry4] : DCIS right breast, gr 2, ER/IA + [TTNM] : is [NTNM] : o [MTNM] : o

## 2022-01-27 NOTE — PHYSICAL EXAM
[Thin] : thin [Breast Palpation Mass] : no palpable masses [Breast Abnormal Lactation (Galactorrhea)] : no nipple discharge [No UE Edema] : there is no upper extremity edema [Supraclavicular Lymph Nodes Enlarged Bilaterally] : supraclavicular [Axillary Lymph Nodes Enlarged Bilaterally] : axillary [Normal] : no palpable adenopathy [de-identified] : Healing right breast  periareolar incision.

## 2022-01-27 NOTE — HISTORY OF PRESENT ILLNESS
[FreeTextEntry1] : This is am 86 year old female diagnosed with right breast cancer in September 2021 referred by Dr. Canela. \par \par Routine mammogram/sono performed on 8/12/21 showed a 3 mm nodule in the right breast at 11:00. Right breast microcalcifications seen. \par \par Biopsy of the right breast at 11:00, 1 cm FN performed 9/13/21 showed ductal carcinoma in situ, solid variant, low nuclear grade. Invasive carcinoma is not identified. Detached clusters of malignant cells are noted within blood clots. ER >90% positive, ME 80% moderately positive. \par \par Bilateral breast MRI performed on 9/27/21 showed susceptibility artifact from the biopsy clip in the middle depth of the central to nipple slightly lateral right breast with surrounding possible suspicious enhancement extending 1.0 cm anteriorly from the clip and 1.6 cm posterior to the clip. In the left breast-  Clumped 3.5 cm region of linear enhancement in the slightly inferior lateral left quadrant which extends superiorly to the skin, just above the nipple. Suspicious enhancing 4 mm lesion at the approximate left 12-1:00 position, 5 cm FN. Linear 0.5 cm region of enhancement in the far retroareolar left breast, suspicious in kinetics. 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 She met with Cancer Genetics in December 2021 to discuss her gene mutation findings.\par \par Biopsy of the left breast at 1:00, 4 cm FN performed on 11/2/21 was negative for malignancy. Ductal hyperplasia, non-atypical.\par Additional biopsy of the left breast at 11-12:00 performed 11/11/21 showed predominantly fatty tissue with patchy benign breast tissue and stromal fibrosis stromal fibrosis. \par \par Right breast lumpectomy with Magseed localization performed 1/3/22 by Dr. Canela. Pathology showed DCIS, solid, cribriform and papillary types with intermediate nuclear grade, focal sclerosis and microcalcifications, largest contiguous focus measured 5 mm. ER 90% positive, ME 80% positive. No invasive component present. Margins negative. Distance from closest margin is 7 mm. No lymph nodes submitted. \par \par She presents to discuss the role of radiation therapy in her care. Reports feeling well.  Has no specific complaints.

## 2022-02-10 ENCOUNTER — EMERGENCY (EMERGENCY)
Facility: HOSPITAL | Age: 87
LOS: 0 days | Discharge: HOME | End: 2022-02-11
Attending: HOSPITALIST
Payer: MEDICARE

## 2022-02-10 VITALS
OXYGEN SATURATION: 100 % | HEART RATE: 90 BPM | RESPIRATION RATE: 18 BRPM | DIASTOLIC BLOOD PRESSURE: 76 MMHG | TEMPERATURE: 98 F | SYSTOLIC BLOOD PRESSURE: 158 MMHG

## 2022-02-10 DIAGNOSIS — Z98.49 CATARACT EXTRACTION STATUS, UNSPECIFIED EYE: Chronic | ICD-10-CM

## 2022-02-10 DIAGNOSIS — Z20.822 CONTACT WITH AND (SUSPECTED) EXPOSURE TO COVID-19: ICD-10-CM

## 2022-02-10 DIAGNOSIS — Z88.3 ALLERGY STATUS TO OTHER ANTI-INFECTIVE AGENTS: ICD-10-CM

## 2022-02-10 DIAGNOSIS — K59.00 CONSTIPATION, UNSPECIFIED: ICD-10-CM

## 2022-02-10 DIAGNOSIS — Z96.641 PRESENCE OF RIGHT ARTIFICIAL HIP JOINT: Chronic | ICD-10-CM

## 2022-02-10 DIAGNOSIS — R10.9 UNSPECIFIED ABDOMINAL PAIN: ICD-10-CM

## 2022-02-10 DIAGNOSIS — I10 ESSENTIAL (PRIMARY) HYPERTENSION: ICD-10-CM

## 2022-02-10 LAB
ALBUMIN SERPL ELPH-MCNC: 2.8 G/DL — LOW (ref 3.3–5)
ALP SERPL-CCNC: 57 U/L — SIGNIFICANT CHANGE UP (ref 40–120)
ALT FLD-CCNC: 26 U/L — SIGNIFICANT CHANGE UP (ref 12–78)
ANION GAP SERPL CALC-SCNC: 6 MMOL/L — SIGNIFICANT CHANGE UP (ref 5–17)
APPEARANCE UR: CLEAR — SIGNIFICANT CHANGE UP
AST SERPL-CCNC: 13 U/L — LOW (ref 15–37)
BASOPHILS # BLD AUTO: 0.04 K/UL — SIGNIFICANT CHANGE UP (ref 0–0.2)
BASOPHILS NFR BLD AUTO: 0.5 % — SIGNIFICANT CHANGE UP (ref 0–2)
BILIRUB SERPL-MCNC: 0.5 MG/DL — SIGNIFICANT CHANGE UP (ref 0.2–1.2)
BILIRUB UR-MCNC: NEGATIVE — SIGNIFICANT CHANGE UP
BUN SERPL-MCNC: 16 MG/DL — SIGNIFICANT CHANGE UP (ref 7–23)
CALCIUM SERPL-MCNC: 8.8 MG/DL — SIGNIFICANT CHANGE UP (ref 8.5–10.1)
CHLORIDE SERPL-SCNC: 96 MMOL/L — SIGNIFICANT CHANGE UP (ref 96–108)
CO2 SERPL-SCNC: 34 MMOL/L — HIGH (ref 22–31)
COLOR SPEC: YELLOW — SIGNIFICANT CHANGE UP
CREAT SERPL-MCNC: 0.48 MG/DL — LOW (ref 0.5–1.3)
DIFF PNL FLD: ABNORMAL
EOSINOPHIL # BLD AUTO: 0.05 K/UL — SIGNIFICANT CHANGE UP (ref 0–0.5)
EOSINOPHIL NFR BLD AUTO: 0.6 % — SIGNIFICANT CHANGE UP (ref 0–6)
GLUCOSE SERPL-MCNC: 103 MG/DL — HIGH (ref 70–99)
GLUCOSE UR QL: NEGATIVE — SIGNIFICANT CHANGE UP
HCT VFR BLD CALC: 38.4 % — SIGNIFICANT CHANGE UP (ref 34.5–45)
HGB BLD-MCNC: 12.9 G/DL — SIGNIFICANT CHANGE UP (ref 11.5–15.5)
IMM GRANULOCYTES NFR BLD AUTO: 0.5 % — SIGNIFICANT CHANGE UP (ref 0–1.5)
KETONES UR-MCNC: ABNORMAL
LACTATE SERPL-SCNC: 0.9 MMOL/L — SIGNIFICANT CHANGE UP (ref 0.7–2)
LEUKOCYTE ESTERASE UR-ACNC: ABNORMAL
LYMPHOCYTES # BLD AUTO: 1.36 K/UL — SIGNIFICANT CHANGE UP (ref 1–3.3)
LYMPHOCYTES # BLD AUTO: 16.6 % — SIGNIFICANT CHANGE UP (ref 13–44)
MCHC RBC-ENTMCNC: 30.1 PG — SIGNIFICANT CHANGE UP (ref 27–34)
MCHC RBC-ENTMCNC: 33.6 GM/DL — SIGNIFICANT CHANGE UP (ref 32–36)
MCV RBC AUTO: 89.5 FL — SIGNIFICANT CHANGE UP (ref 80–100)
MONOCYTES # BLD AUTO: 1.02 K/UL — HIGH (ref 0–0.9)
MONOCYTES NFR BLD AUTO: 12.5 % — SIGNIFICANT CHANGE UP (ref 2–14)
NEUTROPHILS # BLD AUTO: 5.66 K/UL — SIGNIFICANT CHANGE UP (ref 1.8–7.4)
NEUTROPHILS NFR BLD AUTO: 69.3 % — SIGNIFICANT CHANGE UP (ref 43–77)
NITRITE UR-MCNC: NEGATIVE — SIGNIFICANT CHANGE UP
PH UR: 7 — SIGNIFICANT CHANGE UP (ref 5–8)
PLATELET # BLD AUTO: 231 K/UL — SIGNIFICANT CHANGE UP (ref 150–400)
POTASSIUM SERPL-MCNC: 3.2 MMOL/L — LOW (ref 3.5–5.3)
POTASSIUM SERPL-SCNC: 3.2 MMOL/L — LOW (ref 3.5–5.3)
PROT SERPL-MCNC: 6.8 GM/DL — SIGNIFICANT CHANGE UP (ref 6–8.3)
PROT UR-MCNC: SIGNIFICANT CHANGE UP MG/DL
RBC # BLD: 4.29 M/UL — SIGNIFICANT CHANGE UP (ref 3.8–5.2)
RBC # FLD: 13.5 % — SIGNIFICANT CHANGE UP (ref 10.3–14.5)
SODIUM SERPL-SCNC: 136 MMOL/L — SIGNIFICANT CHANGE UP (ref 135–145)
SP GR SPEC: 1.01 — SIGNIFICANT CHANGE UP (ref 1.01–1.02)
UROBILINOGEN FLD QL: NEGATIVE — SIGNIFICANT CHANGE UP
WBC # BLD: 8.17 K/UL — SIGNIFICANT CHANGE UP (ref 3.8–10.5)
WBC # FLD AUTO: 8.17 K/UL — SIGNIFICANT CHANGE UP (ref 3.8–10.5)

## 2022-02-10 PROCEDURE — 93010 ELECTROCARDIOGRAM REPORT: CPT

## 2022-02-10 PROCEDURE — 83690 ASSAY OF LIPASE: CPT

## 2022-02-10 PROCEDURE — 74177 CT ABD & PELVIS W/CONTRAST: CPT | Mod: MA

## 2022-02-10 PROCEDURE — U0005: CPT

## 2022-02-10 PROCEDURE — 99285 EMERGENCY DEPT VISIT HI MDM: CPT | Mod: 25

## 2022-02-10 PROCEDURE — 99284 EMERGENCY DEPT VISIT MOD MDM: CPT

## 2022-02-10 PROCEDURE — 96374 THER/PROPH/DIAG INJ IV PUSH: CPT

## 2022-02-10 PROCEDURE — U0003: CPT

## 2022-02-10 PROCEDURE — 85025 COMPLETE CBC W/AUTO DIFF WBC: CPT

## 2022-02-10 PROCEDURE — 96375 TX/PRO/DX INJ NEW DRUG ADDON: CPT

## 2022-02-10 PROCEDURE — 81001 URINALYSIS AUTO W/SCOPE: CPT

## 2022-02-10 PROCEDURE — 93005 ELECTROCARDIOGRAM TRACING: CPT

## 2022-02-10 PROCEDURE — 36415 COLL VENOUS BLD VENIPUNCTURE: CPT

## 2022-02-10 PROCEDURE — 87086 URINE CULTURE/COLONY COUNT: CPT

## 2022-02-10 PROCEDURE — 83605 ASSAY OF LACTIC ACID: CPT

## 2022-02-10 PROCEDURE — 80053 COMPREHEN METABOLIC PANEL: CPT

## 2022-02-10 RX ORDER — ONDANSETRON 8 MG/1
4 TABLET, FILM COATED ORAL ONCE
Refills: 0 | Status: COMPLETED | OUTPATIENT
Start: 2022-02-10 | End: 2022-02-10

## 2022-02-10 RX ORDER — MORPHINE SULFATE 50 MG/1
2 CAPSULE, EXTENDED RELEASE ORAL ONCE
Refills: 0 | Status: DISCONTINUED | OUTPATIENT
Start: 2022-02-10 | End: 2022-02-10

## 2022-02-10 NOTE — ED ADULT TRIAGE NOTE - CHIEF COMPLAINT QUOTE
seen at  for constipation x1day. found to have lower abdominal swelling. complains of RLQ pain. low grade fevers at . pt. sent for further eval. denies N/V chills.

## 2022-02-11 ENCOUNTER — TRANSCRIPTION ENCOUNTER (OUTPATIENT)
Age: 87
End: 2022-02-11

## 2022-02-11 VITALS
SYSTOLIC BLOOD PRESSURE: 144 MMHG | OXYGEN SATURATION: 100 % | RESPIRATION RATE: 18 BRPM | TEMPERATURE: 99 F | DIASTOLIC BLOOD PRESSURE: 70 MMHG | HEART RATE: 86 BPM

## 2022-02-11 LAB
LIDOCAIN IGE QN: 150 U/L — SIGNIFICANT CHANGE UP (ref 73–393)
SARS-COV-2 RNA SPEC QL NAA+PROBE: SIGNIFICANT CHANGE UP

## 2022-02-11 PROCEDURE — 74177 CT ABD & PELVIS W/CONTRAST: CPT | Mod: 26,MA

## 2022-02-11 RX ORDER — POLYETHYLENE GLYCOL 3350 17 G/17G
17 POWDER, FOR SOLUTION ORAL
Qty: 238 | Refills: 0
Start: 2022-02-11 | End: 2022-02-24

## 2022-02-11 RX ORDER — MULTIVIT WITH MIN/MFOLATE/K2 340-15/3 G
1 POWDER (GRAM) ORAL ONCE
Refills: 0 | Status: COMPLETED | OUTPATIENT
Start: 2022-02-11 | End: 2022-02-11

## 2022-02-11 RX ORDER — POTASSIUM CHLORIDE 20 MEQ
20 PACKET (EA) ORAL ONCE
Refills: 0 | Status: COMPLETED | OUTPATIENT
Start: 2022-02-11 | End: 2022-02-11

## 2022-02-11 RX ADMIN — Medication 1 BOTTLE: at 03:43

## 2022-02-11 RX ADMIN — Medication 20 MILLIEQUIVALENT(S): at 03:42

## 2022-02-11 NOTE — ED PROVIDER NOTE - CLINICAL SUMMARY MEDICAL DECISION MAKING FREE TEXT BOX
86F with right groin swelling, presumed hernia, not reducible and constipated. r/o incarceration/strangulation. labs, ct, pain control.

## 2022-02-11 NOTE — ED PROVIDER NOTE - PROGRESS NOTE DETAILS
Brenda TERRY: patient declined pain medications. CT read noted and patient evaluated by surgical team. no acute intervention today. can treat for constipation and patient can f/u in Dr. Chanel's office tomorrow. K noted, will supplement here and dc home.

## 2022-02-11 NOTE — ED PROVIDER NOTE - NSFOLLOWUPINSTRUCTIONS_ED_ALL_ED_FT
Please take miralax as prescribed  please follow up with Dr. Chanel tomorrow as discussed with his PA.

## 2022-02-11 NOTE — ED PROVIDER NOTE - CARE PROVIDER_API CALL
Kelechi Chanel  SURGERY  [address] [place] [address]  Phone: [phone]  Fax: [phone]  Follow Up Time: 1-3 Days

## 2022-02-11 NOTE — CONSULT NOTE ADULT - SUBJECTIVE AND OBJECTIVE BOX
The pt is an 87y/o F went to urgent care yesterday with complaint of constipation x 2 days. Upon evaluation at urgent care the patient was found to have a small bulge in her right groin.  The pt said she has had a small bulge there but it never really bothered her. The pt complains of no BM since tuesday, and admits to a low grade temp of 99 yesterday she is tolerating a regular diet, denies nausea or vomiting.    PAST MEDICAL HISTORY:  Arthritis     Ductal carcinoma of breast right breast    Hepatitis B resolved    Hip pain, acute, right     Hypertension     Shingles.     PAST SURGICAL HISTORY:  S/P cataract extraction, unspecified laterality bilateral    S/P hip replacement, right.    MEDS:    · 	oxyCODONE 5 mg oral tablet: 1 tab orally every 6 hours, As Needed -for moderate pain MDD:8  · 	hydroCHLOROthiazide 25 mg oral tablet: 1 tab(s) orally once a day  · 	Calcium 600+D oral tablet: 1 tab(s) orally once a day  · 	Multiple Vitamins oral tablet: 1 tab(s) orally once a day  · 	Vitamin C 500 mg oral tablet: 1 tab(s) orally once a day    ALLERGIES:  Boric Acid    ROS:  As above and otherwise unremarkable    PE:    T(C): 37 (02-11-22 @ 03:47), Max: 37 (02-11-22 @ 03:47)  HR: 86 (02-11-22 @ 03:47) (86 - 90)  BP: 144/70 (02-11-22 @ 03:47) (144/70 - 158/76)  RR: 18 (02-11-22 @ 03:47) (18 - 18)  SpO2: 100% (02-11-22 @ 03:47) (100% - 100%)  Wt(kg): --    Gen:  Pt is sitting up comfortably in bed  Skin:  warm and dry  HEENT:  NC/AT. PERRL, EOMI, conjunctiva clear, nares patent , buccal mucosa pink and moist  Neck:  no JVD  Lungs:  Clear and equal bilat  Heart:  S1,S2 reg no m  Abd:  non-distended, +BS, soft, non-tender in all 4 quardrants, no guarding no rebound, visable hernia right groin, firm, non-tender to palpation, non-reducible  Neuro:  A&Ox3, CNII-XII grossly intact                          12.9   8.17  )-----------( 231      ( 10 Feb 2022 23:20 )             38.4   02-10    136  |  96  |  16  ----------------------------<  103<H>  3.2<L>   |  34<H>  |  0.48<L>    Ca    8.8      10 Feb 2022 23:20    TPro  6.8  /  Alb  2.8<L>  /  TBili  0.5  /  DBili  x   /  AST  13<L>  /  ALT  26  /  AlkPhos  57  02-10    < from: CT Abdomen and Pelvis w/ IV Cont (02.11.22 @ 01:36) >    CC: [id number] EXAM:  CT ABDOMEN AND PELVIS IC                        PROCEDURE DATE:  02/11/2022     from: CT Abdomen and Pelvis w/ IV Cont (02.11.22 @ 01:36) >  IMPRESSION:  Moderate right femoral hernia containing infiltrated fat concerning for   fat incarceration. Please correlate for reducibility. No bowel herniation.    The hernia contains a mildly distended hyperemic appendix which may be   reactive due to incarceration, however, this should be correlated with   other signs and symptoms such as fever and leukocytosis.

## 2022-02-11 NOTE — ED PROVIDER NOTE - OBJECTIVE STATEMENT
86F p/w right groin swelling and pain for the past 4 days. patient states she has had a small bulge there but it never really bothered her. since monday the area has been more swollen and becoming painful. no BM since tuesday. tolerating po intake however w/o n/v.

## 2022-02-11 NOTE — CONSULT NOTE ADULT - ASSESSMENT
Ass:  Fat containing right inguinal hernia  Plan:  As discussed with Dr. Chanel, in light of the fact that the patient is afebrile, tolerating a diet, no nausea or vomiting and a normal WBC count, pt may be discharged home to follow-up in his office later today

## 2022-02-11 NOTE — CONSULT NOTE ADULT - SUBJECTIVE AND OBJECTIVE BOX
The pt is an 86F presented to urgent care yesterday with complaint of constipation x 2 days.  Upon evaluation at urgent care patient was found to have a small bulge in her right groin.  As per the patient she never knew it was there and doesn't really bother her.  no BM since tuesday. Pt admits to tolerating a regular diet, no nausea or vomiting, states that she had low grade temp of 99 yesterday.      PMH:  Arthritis     Ductal carcinoma of breast right breast    Hepatitis B resolved    Hip pain, acute, right     Hypertension     Shingles.     PAST SURGICAL HISTORY:  S/P cataract extraction, unspecified laterality bilateral    S/P hip replacement, right.     MEDS:  · 	oxyCODONE 5 mg oral tablet: 1 tab orally every 6 hours, As Needed -for moderate pain MDD:8  · 	hydroCHLOROthiazide 25 mg oral tablet: 1 tab(s) orally once a day  · 	Calcium 600+D oral tablet: 1 tab(s) orally once a day  · 	Multiple Vitamins oral tablet: 1 tab(s) orally once a day  	Vitamin C 500 mg oral tablet: 1 tab(s) orally once a day    ALLERGIES:  Boric Acid    ROS:  AS ABOVE AND OTHERWISE UNREMARKABLE    PE:  T(C): 36.8 (02-10-22 @ 21:42), Max: 36.8 (02-10-22 @ 21:42)  HR: 90 (02-10-22 @ 21:42) (90 - 90)  BP: 158/76 (02-10-22 @ 21:42) (158/76 - 158/76)  RR: 18 (02-10-22 @ 21:42) (18 - 18)  SpO2: 100% (02-10-22 @ 21:42) (100% - 100%)  Wt(kg): --

## 2022-02-11 NOTE — ED PROVIDER NOTE - PATIENT PORTAL LINK FT
You can access the FollowMyHealth Patient Portal offered by [facility] by registering at the following website: [email or web address]. By joining [facility]’s FollowMyHealth portal, you will also be able to view your health information using other applications (apps) compatible with our system.

## 2022-02-12 LAB
CULTURE RESULTS: SIGNIFICANT CHANGE UP
SPECIMEN SOURCE: SIGNIFICANT CHANGE UP

## 2022-02-14 ENCOUNTER — RESULT REVIEW (OUTPATIENT)
Age: 87
End: 2022-02-14

## 2022-02-14 ENCOUNTER — INPATIENT (INPATIENT)
Facility: HOSPITAL | Age: 87
LOS: 2 days | Discharge: NURSING, REHAB OR LONG-TERM CARE | DRG: 341 | End: 2022-02-17
Attending: INTERNAL MEDICINE | Admitting: SURGERY
Payer: MEDICARE

## 2022-02-14 VITALS
DIASTOLIC BLOOD PRESSURE: 71 MMHG | TEMPERATURE: 98 F | WEIGHT: 119.93 LBS | SYSTOLIC BLOOD PRESSURE: 143 MMHG | OXYGEN SATURATION: 100 % | HEART RATE: 97 BPM | RESPIRATION RATE: 16 BRPM | HEIGHT: 62 IN

## 2022-02-14 DIAGNOSIS — K40.30 UNILATERAL INGUINAL HERNIA, WITH OBSTRUCTION, WITHOUT GANGRENE, NOT SPECIFIED AS RECURRENT: ICD-10-CM

## 2022-02-14 DIAGNOSIS — Z96.641 PRESENCE OF RIGHT ARTIFICIAL HIP JOINT: Chronic | ICD-10-CM

## 2022-02-14 DIAGNOSIS — Z98.49 CATARACT EXTRACTION STATUS, UNSPECIFIED EYE: Chronic | ICD-10-CM

## 2022-02-14 PROCEDURE — 88304 TISSUE EXAM BY PATHOLOGIST: CPT | Mod: 26

## 2022-02-14 PROCEDURE — 88302 TISSUE EXAM BY PATHOLOGIST: CPT | Mod: 26

## 2022-02-14 RX ORDER — MORPHINE SULFATE 50 MG/1
4 CAPSULE, EXTENDED RELEASE ORAL EVERY 4 HOURS
Refills: 0 | Status: DISCONTINUED | OUTPATIENT
Start: 2022-02-14 | End: 2022-02-17

## 2022-02-14 RX ORDER — PIPERACILLIN AND TAZOBACTAM 4; .5 G/20ML; G/20ML
3.38 INJECTION, POWDER, LYOPHILIZED, FOR SOLUTION INTRAVENOUS ONCE
Refills: 0 | Status: COMPLETED | OUTPATIENT
Start: 2022-02-14 | End: 2022-02-14

## 2022-02-14 RX ORDER — SODIUM CHLORIDE 9 MG/ML
1000 INJECTION INTRAMUSCULAR; INTRAVENOUS; SUBCUTANEOUS
Refills: 0 | Status: DISCONTINUED | OUTPATIENT
Start: 2022-02-14 | End: 2022-02-15

## 2022-02-14 RX ORDER — SODIUM CHLORIDE 9 MG/ML
1000 INJECTION, SOLUTION INTRAVENOUS
Refills: 0 | Status: DISCONTINUED | OUTPATIENT
Start: 2022-02-14 | End: 2022-02-14

## 2022-02-14 RX ORDER — ACETAMINOPHEN 500 MG
325 TABLET ORAL EVERY 4 HOURS
Refills: 0 | Status: DISCONTINUED | OUTPATIENT
Start: 2022-02-14 | End: 2022-02-17

## 2022-02-14 RX ORDER — FENTANYL CITRATE 50 UG/ML
50 INJECTION INTRAVENOUS
Refills: 0 | Status: DISCONTINUED | OUTPATIENT
Start: 2022-02-14 | End: 2022-02-14

## 2022-02-14 RX ORDER — OXYCODONE HYDROCHLORIDE 5 MG/1
10 TABLET ORAL ONCE
Refills: 0 | Status: DISCONTINUED | OUTPATIENT
Start: 2022-02-14 | End: 2022-02-14

## 2022-02-14 RX ORDER — ONDANSETRON 8 MG/1
4 TABLET, FILM COATED ORAL ONCE
Refills: 0 | Status: DISCONTINUED | OUTPATIENT
Start: 2022-02-14 | End: 2022-02-14

## 2022-02-14 RX ORDER — HEPARIN SODIUM 5000 [USP'U]/ML
5000 INJECTION INTRAVENOUS; SUBCUTANEOUS EVERY 12 HOURS
Refills: 0 | Status: DISCONTINUED | OUTPATIENT
Start: 2022-02-14 | End: 2022-02-17

## 2022-02-14 RX ORDER — OXYCODONE AND ACETAMINOPHEN 5; 325 MG/1; MG/1
1 TABLET ORAL EVERY 4 HOURS
Refills: 0 | Status: DISCONTINUED | OUTPATIENT
Start: 2022-02-14 | End: 2022-02-17

## 2022-02-14 RX ORDER — PIPERACILLIN AND TAZOBACTAM 4; .5 G/20ML; G/20ML
3.38 INJECTION, POWDER, LYOPHILIZED, FOR SOLUTION INTRAVENOUS EVERY 8 HOURS
Refills: 0 | Status: DISCONTINUED | OUTPATIENT
Start: 2022-02-14 | End: 2022-02-16

## 2022-02-14 RX ADMIN — SODIUM CHLORIDE 80 MILLILITER(S): 9 INJECTION INTRAMUSCULAR; INTRAVENOUS; SUBCUTANEOUS at 18:31

## 2022-02-14 RX ADMIN — PIPERACILLIN AND TAZOBACTAM 3.38 GRAM(S): 4; .5 INJECTION, POWDER, LYOPHILIZED, FOR SOLUTION INTRAVENOUS at 14:05

## 2022-02-14 RX ADMIN — HEPARIN SODIUM 5000 UNIT(S): 5000 INJECTION INTRAVENOUS; SUBCUTANEOUS at 21:07

## 2022-02-14 RX ADMIN — PIPERACILLIN AND TAZOBACTAM 25 GRAM(S): 4; .5 INJECTION, POWDER, LYOPHILIZED, FOR SOLUTION INTRAVENOUS at 21:08

## 2022-02-14 NOTE — BRIEF OPERATIVE NOTE - NSICDXBRIEFPROCEDURE_GEN_ALL_CORE_FT
PROCEDURES:  Appendectomy 14-Feb-2022 11:58:04  Kelechi Chanel  Repair, femoral hernia 14-Feb-2022 11:58:19  Kelechi Chanel

## 2022-02-14 NOTE — ASU PATIENT PROFILE, ADULT - FALL HARM RISK - HARM RISK INTERVENTIONS

## 2022-02-14 NOTE — BRIEF OPERATIVE NOTE - NSICDXBRIEFPOSTOP_GEN_ALL_CORE_FT
POST-OP DIAGNOSIS:  Incarcerated femoral hernia 14-Feb-2022 11:58:50  Kelechi Chanel  Perforated appendicitis 14-Feb-2022 11:59:06  Kelechi Chanel

## 2022-02-15 DIAGNOSIS — K41.30 UNILATERAL FEMORAL HERNIA, WITH OBSTRUCTION, WITHOUT GANGRENE, NOT SPECIFIED AS RECURRENT: ICD-10-CM

## 2022-02-15 LAB
ANION GAP SERPL CALC-SCNC: 5 MMOL/L — SIGNIFICANT CHANGE UP (ref 5–17)
BASOPHILS # BLD AUTO: 0.03 K/UL — SIGNIFICANT CHANGE UP (ref 0–0.2)
BASOPHILS NFR BLD AUTO: 0.2 % — SIGNIFICANT CHANGE UP (ref 0–2)
BUN SERPL-MCNC: 8 MG/DL — SIGNIFICANT CHANGE UP (ref 7–23)
CALCIUM SERPL-MCNC: 8.1 MG/DL — LOW (ref 8.5–10.1)
CHLORIDE SERPL-SCNC: 103 MMOL/L — SIGNIFICANT CHANGE UP (ref 96–108)
CO2 SERPL-SCNC: 30 MMOL/L — SIGNIFICANT CHANGE UP (ref 22–31)
CREAT SERPL-MCNC: 0.43 MG/DL — LOW (ref 0.5–1.3)
EOSINOPHIL # BLD AUTO: 0 K/UL — SIGNIFICANT CHANGE UP (ref 0–0.5)
EOSINOPHIL NFR BLD AUTO: 0 % — SIGNIFICANT CHANGE UP (ref 0–6)
GLUCOSE SERPL-MCNC: 133 MG/DL — HIGH (ref 70–99)
HCT VFR BLD CALC: 34.5 % — SIGNIFICANT CHANGE UP (ref 34.5–45)
HGB BLD-MCNC: 11.5 G/DL — SIGNIFICANT CHANGE UP (ref 11.5–15.5)
IMM GRANULOCYTES NFR BLD AUTO: 0.9 % — SIGNIFICANT CHANGE UP (ref 0–1.5)
LYMPHOCYTES # BLD AUTO: 1.19 K/UL — SIGNIFICANT CHANGE UP (ref 1–3.3)
LYMPHOCYTES # BLD AUTO: 8.2 % — LOW (ref 13–44)
MCHC RBC-ENTMCNC: 29.5 PG — SIGNIFICANT CHANGE UP (ref 27–34)
MCHC RBC-ENTMCNC: 33.3 GM/DL — SIGNIFICANT CHANGE UP (ref 32–36)
MCV RBC AUTO: 88.5 FL — SIGNIFICANT CHANGE UP (ref 80–100)
MONOCYTES # BLD AUTO: 0.97 K/UL — HIGH (ref 0–0.9)
MONOCYTES NFR BLD AUTO: 6.7 % — SIGNIFICANT CHANGE UP (ref 2–14)
NEUTROPHILS # BLD AUTO: 12.13 K/UL — HIGH (ref 1.8–7.4)
NEUTROPHILS NFR BLD AUTO: 84 % — HIGH (ref 43–77)
PLATELET # BLD AUTO: 256 K/UL — SIGNIFICANT CHANGE UP (ref 150–400)
POTASSIUM SERPL-MCNC: 3.3 MMOL/L — LOW (ref 3.5–5.3)
POTASSIUM SERPL-SCNC: 3.3 MMOL/L — LOW (ref 3.5–5.3)
RBC # BLD: 3.9 M/UL — SIGNIFICANT CHANGE UP (ref 3.8–5.2)
RBC # FLD: 13.2 % — SIGNIFICANT CHANGE UP (ref 10.3–14.5)
SODIUM SERPL-SCNC: 138 MMOL/L — SIGNIFICANT CHANGE UP (ref 135–145)
WBC # BLD: 14.45 K/UL — HIGH (ref 3.8–10.5)
WBC # FLD AUTO: 14.45 K/UL — HIGH (ref 3.8–10.5)

## 2022-02-15 PROCEDURE — 80048 BASIC METABOLIC PNL TOTAL CA: CPT

## 2022-02-15 PROCEDURE — 36415 COLL VENOUS BLD VENIPUNCTURE: CPT

## 2022-02-15 PROCEDURE — U0005: CPT

## 2022-02-15 PROCEDURE — 97116 GAIT TRAINING THERAPY: CPT | Mod: GP

## 2022-02-15 PROCEDURE — 83735 ASSAY OF MAGNESIUM: CPT

## 2022-02-15 PROCEDURE — 85027 COMPLETE CBC AUTOMATED: CPT

## 2022-02-15 PROCEDURE — 86481 TB AG RESPONSE T-CELL SUSP: CPT

## 2022-02-15 PROCEDURE — U0003: CPT

## 2022-02-15 PROCEDURE — 97162 PT EVAL MOD COMPLEX 30 MIN: CPT | Mod: GP

## 2022-02-15 PROCEDURE — 71045 X-RAY EXAM CHEST 1 VIEW: CPT

## 2022-02-15 PROCEDURE — 99222 1ST HOSP IP/OBS MODERATE 55: CPT

## 2022-02-15 PROCEDURE — 85025 COMPLETE CBC W/AUTO DIFF WBC: CPT

## 2022-02-15 PROCEDURE — 97530 THERAPEUTIC ACTIVITIES: CPT | Mod: GP

## 2022-02-15 PROCEDURE — 82607 VITAMIN B-12: CPT

## 2022-02-15 PROCEDURE — C1751: CPT

## 2022-02-15 PROCEDURE — 84443 ASSAY THYROID STIM HORMONE: CPT

## 2022-02-15 RX ORDER — POTASSIUM CHLORIDE 20 MEQ
40 PACKET (EA) ORAL EVERY 4 HOURS
Refills: 0 | Status: COMPLETED | OUTPATIENT
Start: 2022-02-15 | End: 2022-02-15

## 2022-02-15 RX ORDER — LANOLIN ALCOHOL/MO/W.PET/CERES
5 CREAM (GRAM) TOPICAL AT BEDTIME
Refills: 0 | Status: DISCONTINUED | OUTPATIENT
Start: 2022-02-15 | End: 2022-02-17

## 2022-02-15 RX ADMIN — HEPARIN SODIUM 5000 UNIT(S): 5000 INJECTION INTRAVENOUS; SUBCUTANEOUS at 21:15

## 2022-02-15 RX ADMIN — Medication 40 MILLIEQUIVALENT(S): at 21:15

## 2022-02-15 RX ADMIN — Medication 5 MILLIGRAM(S): at 21:14

## 2022-02-15 RX ADMIN — HEPARIN SODIUM 5000 UNIT(S): 5000 INJECTION INTRAVENOUS; SUBCUTANEOUS at 09:17

## 2022-02-15 RX ADMIN — PIPERACILLIN AND TAZOBACTAM 25 GRAM(S): 4; .5 INJECTION, POWDER, LYOPHILIZED, FOR SOLUTION INTRAVENOUS at 13:44

## 2022-02-15 RX ADMIN — PIPERACILLIN AND TAZOBACTAM 25 GRAM(S): 4; .5 INJECTION, POWDER, LYOPHILIZED, FOR SOLUTION INTRAVENOUS at 05:13

## 2022-02-15 RX ADMIN — PIPERACILLIN AND TAZOBACTAM 25 GRAM(S): 4; .5 INJECTION, POWDER, LYOPHILIZED, FOR SOLUTION INTRAVENOUS at 21:16
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
## 2022-02-15 NOTE — CONSULT NOTE ADULT - ATTENDING COMMENTS
I saw the patient with her son at bedside.  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  She has been independent in ADLs but is somewhat overwhelmed by all of her responsibilities.  Her son reports some paranoia (barricading her doors at home).  She is alert and oriented to person, place, time and situation.   Able to name current and most recent past president.  She recalled 2/3 words after 3 minutes.  Able to perform 5 steps of serial 7s.    It is possible that the her paranoia is an early sign of dementia. However, she does seem to be able to function otherwise at home.  Will check TSH and vitamin B12 to look for any reversible causes of confusion.  We discussed ways to simplify things at home - automatic bill payment, weekly pill box, meals on wheels.  She would be open to some help at home.  She and her  live in a split level which physically may be difficult for her .  Her son would ultimately like to see them move to an apartment or assisted living but they are resistant.    Will f/u if needed.

## 2022-02-15 NOTE — PROGRESS NOTE ADULT - SUBJECTIVE AND OBJECTIVE BOX
Feels well, no major complaints    VSS afeb    lungs- clear  Cor- RRR  Abd- + BS soft non tender, dressing dry, intact  Ext- no edema

## 2022-02-15 NOTE — PHYSICAL THERAPY INITIAL EVALUATION ADULT - PLANNED THERAPY INTERVENTIONS, PT EVAL
GOAL: Pt will perform 13 stairs with or without U HR as needed within 4weeks./balance training/bed mobility training/gait training/strengthening/transfer training

## 2022-02-15 NOTE — PHYSICAL THERAPY INITIAL EVALUATION ADULT - LIVES WITH, PROFILE
Pt lives in slit level with 4 steps to enter, 6 upstairs and full flight to basement for laundry. Pt lives with  who is currently at [facility]./spouse

## 2022-02-15 NOTE — PROGRESS NOTE ADULT - ASSESSMENT
Stable, s/p appendectomy, incarcerated R femoral hernia. Adv diet as tolerated. PT and  consult. Medicine and neuro evaluation for dementia.

## 2022-02-15 NOTE — PHYSICAL THERAPY INITIAL EVALUATION ADULT - GENERAL OBSERVATIONS, REHAB EVAL
Pt seen for 45min PT Eval. Pt POD#1 s/p apendectomy, hernia repair. Pt rec'd semi supine in bed in NAD, +IV, son bedside. Pt trans supine>sit with S. Pt trans sit<>Stand with SBA no AD. Pt amb 200ft no AD SBA. Pt dem narrow ZARINA, sway at (times increases with turns), however no LOB. Pt performed standing therex, mayra well. Pt left semi supine all needs met, son bedside, RN aware, +bed alarm.

## 2022-02-15 NOTE — PHYSICAL THERAPY INITIAL EVALUATION ADULT - PERTINENT HX OF CURRENT PROBLEM, REHAB EVAL
85yo female pmh breast CA, shingles, HTN, Rt ESTELLE, now s/p Appendectomy and femoral hernia repair.

## 2022-02-15 NOTE — CONSULT NOTE ADULT - ASSESSMENT
86 yr old F with PMHx of HTN presented to ED on 2/14/22, was found to have perforated appendix and abscess in incarcerated hernia sac, and underwent immediate surgery. Patient is currently under Dr. Chanel's service.     Neurology was consulted today for patient's paranoia. No signs or symptoms of dementia have been noted today, and exam is non focal.     -Recs as per surgery  -Consider psychiatric consult  -DVT prophylaxis  -PT eval/ rehab eval  -Continue to monitor    Will follow as needed

## 2022-02-15 NOTE — PHYSICAL THERAPY INITIAL EVALUATION ADULT - ADDITIONAL COMMENTS
Pt reports being indep without AD for amb and indep with all ADLs prior. Pt has RW at home does not use. Pt states she is th primary caregiver for . pt states  is a "" house is difficult to get around at times.   Son states he is looking into [facility] for DC.

## 2022-02-15 NOTE — PHYSICAL THERAPY INITIAL EVALUATION ADULT - IMPAIRMENTS CONTRIBUTING TO GAIT DEVIATIONS, PT EVAL
decreased endurance/impaired balance/narrow base of support/decreased ROM/scissoring/decreased strength

## 2022-02-16 DIAGNOSIS — I34.0 NONRHEUMATIC MITRAL (VALVE) INSUFFICIENCY: ICD-10-CM

## 2022-02-16 DIAGNOSIS — C50.919 MALIGNANT NEOPLASM OF UNSPECIFIED SITE OF UNSPECIFIED FEMALE BREAST: ICD-10-CM

## 2022-02-16 DIAGNOSIS — F41.9 ANXIETY DISORDER, UNSPECIFIED: ICD-10-CM

## 2022-02-16 DIAGNOSIS — I10 ESSENTIAL (PRIMARY) HYPERTENSION: ICD-10-CM

## 2022-02-16 DIAGNOSIS — K40.30 UNILATERAL INGUINAL HERNIA, WITH OBSTRUCTION, WITHOUT GANGRENE, NOT SPECIFIED AS RECURRENT: ICD-10-CM

## 2022-02-16 DIAGNOSIS — I36.1 NONRHEUMATIC TRICUSPID (VALVE) INSUFFICIENCY: ICD-10-CM

## 2022-02-16 LAB
ANION GAP SERPL CALC-SCNC: 5 MMOL/L — SIGNIFICANT CHANGE UP (ref 5–17)
BASOPHILS # BLD AUTO: 0.03 K/UL — SIGNIFICANT CHANGE UP (ref 0–0.2)
BASOPHILS NFR BLD AUTO: 0.4 % — SIGNIFICANT CHANGE UP (ref 0–2)
BUN SERPL-MCNC: 12 MG/DL — SIGNIFICANT CHANGE UP (ref 7–23)
CALCIUM SERPL-MCNC: 8 MG/DL — LOW (ref 8.5–10.1)
CHLORIDE SERPL-SCNC: 107 MMOL/L — SIGNIFICANT CHANGE UP (ref 96–108)
CO2 SERPL-SCNC: 30 MMOL/L — SIGNIFICANT CHANGE UP (ref 22–31)
CREAT SERPL-MCNC: 0.5 MG/DL — SIGNIFICANT CHANGE UP (ref 0.5–1.3)
EOSINOPHIL # BLD AUTO: 0.01 K/UL — SIGNIFICANT CHANGE UP (ref 0–0.5)
EOSINOPHIL NFR BLD AUTO: 0.1 % — SIGNIFICANT CHANGE UP (ref 0–6)
GLUCOSE SERPL-MCNC: 122 MG/DL — HIGH (ref 70–99)
HCT VFR BLD CALC: 34.7 % — SIGNIFICANT CHANGE UP (ref 34.5–45)
HGB BLD-MCNC: 11.3 G/DL — LOW (ref 11.5–15.5)
IMM GRANULOCYTES NFR BLD AUTO: 0.8 % — SIGNIFICANT CHANGE UP (ref 0–1.5)
LYMPHOCYTES # BLD AUTO: 1.55 K/UL — SIGNIFICANT CHANGE UP (ref 1–3.3)
LYMPHOCYTES # BLD AUTO: 19.4 % — SIGNIFICANT CHANGE UP (ref 13–44)
MAGNESIUM SERPL-MCNC: 2.4 MG/DL — SIGNIFICANT CHANGE UP (ref 1.6–2.6)
MCHC RBC-ENTMCNC: 29.9 PG — SIGNIFICANT CHANGE UP (ref 27–34)
MCHC RBC-ENTMCNC: 32.6 GM/DL — SIGNIFICANT CHANGE UP (ref 32–36)
MCV RBC AUTO: 91.8 FL — SIGNIFICANT CHANGE UP (ref 80–100)
MONOCYTES # BLD AUTO: 0.68 K/UL — SIGNIFICANT CHANGE UP (ref 0–0.9)
MONOCYTES NFR BLD AUTO: 8.5 % — SIGNIFICANT CHANGE UP (ref 2–14)
NEUTROPHILS # BLD AUTO: 5.66 K/UL — SIGNIFICANT CHANGE UP (ref 1.8–7.4)
NEUTROPHILS NFR BLD AUTO: 70.8 % — SIGNIFICANT CHANGE UP (ref 43–77)
PLATELET # BLD AUTO: 274 K/UL — SIGNIFICANT CHANGE UP (ref 150–400)
POTASSIUM SERPL-MCNC: 4 MMOL/L — SIGNIFICANT CHANGE UP (ref 3.5–5.3)
POTASSIUM SERPL-SCNC: 4 MMOL/L — SIGNIFICANT CHANGE UP (ref 3.5–5.3)
RBC # BLD: 3.78 M/UL — LOW (ref 3.8–5.2)
RBC # FLD: 13.8 % — SIGNIFICANT CHANGE UP (ref 10.3–14.5)
SODIUM SERPL-SCNC: 142 MMOL/L — SIGNIFICANT CHANGE UP (ref 135–145)
TSH SERPL-MCNC: 2.46 UU/ML — SIGNIFICANT CHANGE UP (ref 0.34–4.82)
VIT B12 SERPL-MCNC: 1348 PG/ML — HIGH (ref 232–1245)
WBC # BLD: 7.99 K/UL — SIGNIFICANT CHANGE UP (ref 3.8–10.5)
WBC # FLD AUTO: 7.99 K/UL — SIGNIFICANT CHANGE UP (ref 3.8–10.5)

## 2022-02-16 PROCEDURE — 71045 X-RAY EXAM CHEST 1 VIEW: CPT | Mod: 26

## 2022-02-16 PROCEDURE — 99232 SBSQ HOSP IP/OBS MODERATE 35: CPT

## 2022-02-16 RX ORDER — ERTAPENEM SODIUM 1 G/1
1000 INJECTION, POWDER, LYOPHILIZED, FOR SOLUTION INTRAMUSCULAR; INTRAVENOUS ONCE
Refills: 0 | Status: COMPLETED | OUTPATIENT
Start: 2022-02-16 | End: 2022-02-16

## 2022-02-16 RX ORDER — POLYETHYLENE GLYCOL 3350 17 G/17G
17 POWDER, FOR SOLUTION ORAL DAILY
Refills: 0 | Status: DISCONTINUED | OUTPATIENT
Start: 2022-02-16 | End: 2022-02-17

## 2022-02-16 RX ORDER — ERTAPENEM SODIUM 1 G/1
1000 INJECTION, POWDER, LYOPHILIZED, FOR SOLUTION INTRAMUSCULAR; INTRAVENOUS EVERY 24 HOURS
Refills: 0 | Status: DISCONTINUED | OUTPATIENT
Start: 2022-02-17 | End: 2022-02-17

## 2022-02-16 RX ORDER — ERTAPENEM SODIUM 1 G/1
INJECTION, POWDER, LYOPHILIZED, FOR SOLUTION INTRAMUSCULAR; INTRAVENOUS
Refills: 0 | Status: DISCONTINUED | OUTPATIENT
Start: 2022-02-16 | End: 2022-02-17

## 2022-02-16 RX ORDER — TUBERCULIN PURIFIED PROTEIN DERIVATIVE 5 [IU]/.1ML
5 INJECTION, SOLUTION INTRADERMAL ONCE
Refills: 0 | Status: COMPLETED | OUTPATIENT
Start: 2022-02-16 | End: 2022-02-16

## 2022-02-16 RX ADMIN — POLYETHYLENE GLYCOL 3350 17 GRAM(S): 17 POWDER, FOR SOLUTION ORAL at 11:25

## 2022-02-16 RX ADMIN — Medication 5 MILLIGRAM(S): at 21:35

## 2022-02-16 RX ADMIN — HEPARIN SODIUM 5000 UNIT(S): 5000 INJECTION INTRAVENOUS; SUBCUTANEOUS at 09:04

## 2022-02-16 RX ADMIN — HEPARIN SODIUM 5000 UNIT(S): 5000 INJECTION INTRAVENOUS; SUBCUTANEOUS at 21:35

## 2022-02-16 RX ADMIN — ERTAPENEM SODIUM 120 MILLIGRAM(S): 1 INJECTION, POWDER, LYOPHILIZED, FOR SOLUTION INTRAMUSCULAR; INTRAVENOUS at 19:55

## 2022-02-16 RX ADMIN — PIPERACILLIN AND TAZOBACTAM 25 GRAM(S): 4; .5 INJECTION, POWDER, LYOPHILIZED, FOR SOLUTION INTRAVENOUS at 13:23

## 2022-02-16 RX ADMIN — PIPERACILLIN AND TAZOBACTAM 25 GRAM(S): 4; .5 INJECTION, POWDER, LYOPHILIZED, FOR SOLUTION INTRAVENOUS at 05:47
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
## 2022-02-16 NOTE — PROGRESS NOTE ADULT - SUBJECTIVE AND OBJECTIVE BOX
Feels well, tolerating diet    VSS afeb    lungs- clear  Cor- RRR  Abd- + BS soft non tender, dressing dry  Ext- no edema

## 2022-02-16 NOTE — PROGRESS NOTE ADULT - ASSESSMENT
86 yr old F with PMHx of HTN presented to ED on 2/14/22, was found to have perforated appendix and abscess in incarcerated hernia sac, and underwent immediate surgery.  Neurology was consulted today for patient's paranoia.     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  She has been independent in ADLs but is somewhat overwhelmed by all of her responsibilities.  Her son reports some paranoia (barricading her doors at home).    It is possible that the her paranoia is an early sign of dementia. However, she does seem to be able to function otherwise at home.  TSH is normal.  Vitamin B12 level is pending.    Patient is open to relocating to [place] to be near her family.  In the meantime, there are resources that can be provided to help such as meals on wheels, home health aid.    She can follow up with me in the office as needed.    Please call back if additional input is needed from neurology service.

## 2022-02-16 NOTE — CONSULT NOTE ADULT - ASSESSMENT
2/16/22:  Pt with above history and recent breast surgery for intraductal CA presenting with an incarcerated right inguinal hernia.  No new cardiac issues.  Other than needing a BM, she is doing well but may need help at home.  No indication for further cardiac testing at this time.  Will follow as treatment progresses and as an outpt as needed.

## 2022-02-16 NOTE — CONSULT NOTE ADULT - SUBJECTIVE AND OBJECTIVE BOX
CHIEF COMPLAINT:  Patient is a 86y old  Female who presents with a chief complaint of right lower abd pain from incarcerated right inguinal hernia.    HPI: 22:  86 yr old female, well known to me with h/o HTN and recent right breast surgery for intraductal CA presenting with an incarcerated femoral hernia/abscess/perf appendix s/p appendectomy and femoral hernia repair on 22.  Currently she reports that she feels fine -  She denies cp, palpitation or increased SOB.  Post-op pain is under control.  she has been ambulating in the hallway without issues.  She denies having had a BM since admission but is passing flatus.  She had a recent PET nuclear stress test (-) for ischemia and an Echo showing normal LV systolic function with LVEF=60-65% with mild (1+) MR and TR but otherwise a normal study.  She has no new cardiac symptoms.        PMHx:  PAST MEDICAL & SURGICAL HISTORY:  Arthritis  Hip pain, acute, right  Hypertension  Hepatitis B-resolved  Shingles  Ductal carcinoma of breast-right breast  S/P cataract extraction, unspecified laterality-bilateral  S/P hip replacement, right      FAMILY HISTORY:   FAMILY HISTORY:  Family history of coronary artery disease  Family history of von Willebrand disease (Sibling)      ALLERGIES:  Allergies  Boric Acid (Urticaria)      REVIEW OF SYSTEMS:  10 point ROS was obtained  Pertinent positives and negatives are as above  All other review of systems is negative unless indicated above      Vital Signs Last 24 Hrs  T(C): 36.7 (15 Feb 2022 21:01), Max: 36.7 (15 Feb 2022 21:01)  T(F): 98.1 (15 Feb 2022 21:01), Max: 98.1 (15 Feb 2022 21:01)  HR: 93 (15 Feb 2022 21:01) (77 - 93)  BP: 137/55 (15 Feb 2022 21:01) (131/56 - 137/55)  RR: 16 (15 Feb 2022 21:01) (16 - 20)  SpO2: 96% (15 Feb 2022 21:) (95% - 100%)      I&O's Summary    15 Feb 2022 07:01  -  2022 07:00  --------------------------------------------------------  IN: 480 mL / OUT: 25 mL / NET: 455 mL      PHYSICAL EXAM:   Constitutional: NAD, awake and alert, well-developed  HEENT: PERR, EOMI, Normal Hearing, MMM  Neck: Soft and supple, No LAD, No JVD  Respiratory: Breath sounds are clear bilaterally, No wheezing, rales or rhonchi  Cardiovascular: S1 and S2, regular rate and rhythm, soft CORINNA at LLSB and base as before, no gallops or rubs  Gastrointestinal: Bowel Sounds present, soft, nontender, nondistended, no guarding, no rebound  Extremities: No peripheral edema  Vascular: 2+ peripheral pulses  Neurological: A/O x 3, no focal deficits  Musculoskeletal: 5/5 strength b/l upper and lower extremities  Skin: No rashes      MEDICATIONS  (STANDING):  heparin   Injectable 5000 Unit(s) SubCutaneous every 12 hours  melatonin 5 milliGRAM(s) Oral at bedtime  piperacillin/tazobactam IVPB.. 3.375 Gram(s) IV Intermittent every 8 hours    MEDICATIONS  (PRN):  acetaminophen     Tablet .. 325 milliGRAM(s) Oral every 4 hours PRN Temp greater or equal to 38C (100.4F), Mild Pain (1 - 3)  morphine  - Injectable 4 milliGRAM(s) IV Push every 4 hours PRN Severe Pain (7 - 10)  oxycodone    5 mG/acetaminophen 325 mG 1 Tablet(s) Oral every 4 hours PRN Moderate Pain (4 - 6)      LABS: All Labs Reviewed:                        11.3   7.99  )-----------( 274      ( 2022 06:38 )             34.7     02-16    142  |  107  |  12  ----------------------------<  122<H>  4.0   |  30  |  0.50    Ca    8.0<L>      2022 06:38      BLOOD CULTURES: Organism --  Gram Stain Inguinal Abscess -- Gram Stain --  Specimen Source .Abscess RIGHT INGUINAL ABCESS  Culture-Result --Numerous Escherichia coli       LIPID PROFILE     RADIOLOGY:  CT of Abd & Pelvis: 22:  FINDINGS:  LOWER CHEST: Within normal limits.  LIVER: Within normal limits.  BILE DUCTS: Normal caliber.  GALLBLADDER: Within normal limits.  SPLEEN: Hypodense splenic lesions may represent cysts versus hemangiomas.  PANCREAS: Within normal limits.  ADRENALS: Within normal limits.  KIDNEYS/URETERS: Bilateral parapelvic renal cysts and additional subcentimeter hypodense lesions that are too small to characterize. Symmetric enhancement. No hydronephrosis.  BLADDER: Within normal limits.  REPRODUCTIVE ORGANS: Uterus and adnexa within normal limits.  BOWEL: No bowel obstruction. Diverticulosis coli. Appendiceal hyperemia and mild dilatation up to 8 mm. Appendix is contained within a right femoral hernia.  PERITONEUM: No ascites.  VESSELS: Atherosclerotic changes.  RETROPERITONEUM/LYMPH NODES: No lymphadenopathy.  ABDOMINAL WALL: Moderate right femoral hernia containing infiltrated fat as well as a mildly distended appendix which demonstrates a hyperemic wall..  BONES: Degenerative changes.  IMPRESSION:  Moderate right femoral hernia containing infiltrated fat concerning for fat incarceration. Please correlate for reducibility. No bowel herniation.  The hernia contains a mildly distended hyperemic appendix which may be reactive due to incarceration, however, this should be correlated with other signs and symptoms such as fever and leukocytosis.      EK/10/22:  Sinus rhythm with Premature atrial complexes  Septal infarct , age undetermined  Nonspecific T wave abnormality now evident in Anterior leads      TELEMETRY:      ECHO:  Recent PET nuclear stress test (-) for ischemia and Echo showed normal LV systolic function with LVEF=60-65% with mild (1+) MR and TR but otherwise a normal study.

## 2022-02-16 NOTE — BEHAVIORAL HEALTH ASSESSMENT NOTE - SUMMARY
86 year-old  female, living with , with no psychiatric history, presenting s/p appendectomy, incarcerated R femoral hernia; consulted for paranoia.    Patient has genuine concern over her vulnerability given age causing anxiety. Patient concern is reasonable although overestimated. Hence. R/O Paranoia Patient is not psychotic. Patient is not manic or psychotic. Patient not interested in psychotropic management for alleviate anxiety.

## 2022-02-16 NOTE — PROGRESS NOTE ADULT - SUBJECTIVE AND OBJECTIVE BOX
CC: 86 yr old lady with incarcerated femoral hernia/abscess/perf appendix s/p appendectomy and femoral hernia repair on 2/14/22  Medicine called for assistance with her medical issues.  At bedside she reports that she feels fine -  denies cp palps sob. post-op pain under control.   Has been ambulating in the hallway   10 point ROS is otherwise neg.  Per discussion with staff,  patient's family thinks she is has paranoia.   Pt is worried that people will steal her belongings and has been barricading herself in the house.  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    2/16 - late note - pt feels well, no cp palps sob; some soreness at surgical site     Vital Signs Last 24 Hrs  T(C): 36.4 (16 Feb 2022 15:15), Max: 36.7 (15 Feb 2022 21:01)  T(F): 97.6 (16 Feb 2022 15:15), Max: 98.1 (15 Feb 2022 21:01)  HR: 90 (16 Feb 2022 15:15) (78 - 93)  BP: 131/44 (16 Feb 2022 15:15) (123/64 - 137/55)  BP(mean): 77 (16 Feb 2022 08:28) (77 - 77)  RR: 18 (16 Feb 2022 15:15) (16 - 18)  SpO2: 99% (16 Feb 2022 15:15) (96% - 99%)  PHYSICAL EXAM:  GENERAL: NAD, able to lie flat in bed  HEAD:  Atraumatic, Normocephalic  EYES: EOMI, PERRLA, normal sclera  ENT: Moist mucous membranes  NECK: Supple, No JVD, no nuchal rigidity  CHEST/LUNG: Clear to auscultation bilaterally; No rales, rhonchi, wheezing, or rubs. Unlabored respirations  HEART: Regular rate and rhythm; No murmurs, rubs, or gallops  ABDOMEN: Bowel sounds present; Soft, Nontender, Nondistended. No hepatomegaly  rt femoral area with dressing  EXTREMITIES:  no pitting bilaterally  NERVOUS SYSTEM:  Alert & Oriented X3, speech clear. No focal motor or sensory deficits  MSK: FROM all 4 extremities, full and equal strength      MEDICATIONS:  MEDICATIONS  (STANDING):  heparin   Injectable 5000 Unit(s) SubCutaneous every 12 hours  melatonin 5 milliGRAM(s) Oral at bedtime  piperacillin/tazobactam IVPB.. 3.375 Gram(s) IV Intermittent every 8 hours  polyethylene glycol 3350 17 Gram(s) Oral daily      LABS: All Labs Reviewed:                      11.3   7.99  )-----------( 274      ( 16 Feb 2022 06:38 )             34.7   142  |  107  |  12  ----------------------------<  122<H>  4.0   |  30  |  0.50  Ca    8.0<L>      16 Feb 2022 06:38  Mg     2.4     02-16    Blood Culture: 02-14 @ 11:52  Organism Escherichia coli ESBL  Gram Stain Blood -- Gram Stain --  Specimen Source .Abscess RIGHT INGUINAL ABCESS  Culture-Blood --    RADIOLOGY/EKG:  < from: CT Abdomen and Pelvis w/ IV Cont (02.11.22 @ 01:36) >  Moderate right femoral hernia containing infiltrated fat concerning for   fat incarceration. Please correlate for reducibility. No bowel herniation.  The hernia contains a mildly distended hyperemic appendix which may be   reactive due to incarceration, however, this should be correlated with   other signs and symptoms such as fever and leukocytosis.

## 2022-02-16 NOTE — PROGRESS NOTE ADULT - SUBJECTIVE AND OBJECTIVE BOX
Interval History:  2/16/22: She has no new neurological complaints today.    MEDICATIONS  (STANDING):  heparin   Injectable 5000 Unit(s) SubCutaneous every 12 hours  melatonin 5 milliGRAM(s) Oral at bedtime  piperacillin/tazobactam IVPB.. 3.375 Gram(s) IV Intermittent every 8 hours  polyethylene glycol 3350 17 Gram(s) Oral daily    MEDICATIONS  (PRN):  acetaminophen     Tablet .. 325 milliGRAM(s) Oral every 4 hours PRN Temp greater or equal to 38C (100.4F), Mild Pain (1 - 3)  morphine  - Injectable 4 milliGRAM(s) IV Push every 4 hours PRN Severe Pain (7 - 10)  oxycodone    5 mG/acetaminophen 325 mG 1 Tablet(s) Oral every 4 hours PRN Moderate Pain (4 - 6)      Allergies    Boric Acid (Urticaria)    Intolerances        PHYSICAL EXAM:  Vital Signs Last 24 Hrs  T(F): 97.2 (02-16-22 @ 08:28)  HR: 78 (02-16-22 @ 08:28)  BP: 123/64 (02-16-22 @ 08:28)  RR: 18 (02-16-22 @ 08:28)    GENERAL: NAD, well-groomed, well-developed  HEAD:  Atraumatic, Normocephalic  Neuro:  Awake, alert, oriented to person, place, time,  no aphasia  CN: EOMI, no nystagmus, no facial weakness, tongue protrudes in the midline  motor: normal tone, no pronator drift, full strength in all four extremities  sensory: intact to light touch  coordination: finger to nose intact bilaterally      LABS:                        11.3   7.99  )-----------( 274      ( 16 Feb 2022 06:38 )             34.7     02-16    142  |  107  |  12  ----------------------------<  122<H>  4.0   |  30  |  0.50    Ca    8.0<L>      16 Feb 2022 06:38  Mg     2.4     02-16        RADIOLOGY & ADDITIONAL STUDIES:

## 2022-02-16 NOTE — BEHAVIORAL HEALTH ASSESSMENT NOTE - SUICIDE PROTECTIVE FACTORS
Responsibility to family and others/Identifies reasons for living/Has future plans/Supportive social network of family or friends/Positive therapeutic relationships/Ability to cope with stress

## 2022-02-16 NOTE — PROGRESS NOTE ADULT - ASSESSMENT
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   worried that she does not have internet connection at home  walks every day with her walking chiqui; retired lab tech;   in terms of providing her history she was very clear about her medical issues - breast ca/cataracts/hip repl etc and was very reliable.  she has capacity to make her own decisions.   regardless, agree with neuro that given her advanced age she will benefit from closer supervision from her kids and other family.     VTE P x- hep sc     discussed with RN   Pls call with winifred

## 2022-02-16 NOTE — BEHAVIORAL HEALTH ASSESSMENT NOTE - NSBHCHARTREVIEWVS_PSY_A_CORE FT
Vital Signs Last 24 Hrs  T(C): 36.7 (15 Feb 2022 21:01), Max: 36.7 (15 Feb 2022 21:01)  T(F): 98.1 (15 Feb 2022 21:01), Max: 98.1 (15 Feb 2022 21:01)  HR: 93 (15 Feb 2022 21:01) (77 - 93)  BP: 137/55 (15 Feb 2022 21:01) (131/56 - 137/55)  BP(mean): --  RR: 16 (15 Feb 2022 21:01) (16 - 20)  SpO2: 96% (15 Feb 2022 21:01) (95% - 100%)

## 2022-02-16 NOTE — BEHAVIORAL HEALTH ASSESSMENT NOTE - HPI (INCLUDE ILLNESS QUALITY, SEVERITY, DURATION, TIMING, CONTEXT, MODIFYING FACTORS, ASSOCIATED SIGNS AND SYMPTOMS)
86 year-old  female, living with , with no psychiatric history, presenting s/p appendectomy, incarcerated R femoral hernia; consulted for paranoia.     Patient is alert and oriented. Patient is calm and cooperative, euthymic, linear, with no gross thought disorder. Patient endorsing her home life being a long story, however stating wanting to summarize it. Reports being old and vulnerable and concerns over people taking advantage of her or breaking in the house and stealing her property of which she cannot defend her self given age. Otherwise, denies this risk / danger being imminent. Denies thinking people are seeking to harm her specifically. Patient otherwise denying psychotic symptoms. Denies mood symptoms. Denies need for psychiatric treatment.

## 2022-02-16 NOTE — BEHAVIORAL HEALTH ASSESSMENT NOTE - NSBHCHARTREVIEWLAB_PSY_A_CORE FT
02-16    142  |  107  |  12  ----------------------------<  122<H>  4.0   |  30  |  0.50    Ca    8.0<L>      16 Feb 2022 06:38  Mg     2.4     02-16

## 2022-02-17 ENCOUNTER — TRANSCRIPTION ENCOUNTER (OUTPATIENT)
Age: 87
End: 2022-02-17

## 2022-02-17 VITALS
SYSTOLIC BLOOD PRESSURE: 128 MMHG | DIASTOLIC BLOOD PRESSURE: 52 MMHG | HEART RATE: 82 BPM | RESPIRATION RATE: 17 BRPM | OXYGEN SATURATION: 92 % | TEMPERATURE: 97 F

## 2022-02-17 LAB
ANION GAP SERPL CALC-SCNC: 3 MMOL/L — LOW (ref 5–17)
BUN SERPL-MCNC: 10 MG/DL — SIGNIFICANT CHANGE UP (ref 7–23)
CALCIUM SERPL-MCNC: 8.2 MG/DL — LOW (ref 8.5–10.1)
CHLORIDE SERPL-SCNC: 110 MMOL/L — HIGH (ref 96–108)
CO2 SERPL-SCNC: 29 MMOL/L — SIGNIFICANT CHANGE UP (ref 22–31)
CREAT SERPL-MCNC: 0.35 MG/DL — LOW (ref 0.5–1.3)
GLUCOSE SERPL-MCNC: 102 MG/DL — HIGH (ref 70–99)
HCT VFR BLD CALC: 34.6 % — SIGNIFICANT CHANGE UP (ref 34.5–45)
HGB BLD-MCNC: 11.1 G/DL — LOW (ref 11.5–15.5)
MAGNESIUM SERPL-MCNC: 2.4 MG/DL — SIGNIFICANT CHANGE UP (ref 1.6–2.6)
MCHC RBC-ENTMCNC: 29.5 PG — SIGNIFICANT CHANGE UP (ref 27–34)
MCHC RBC-ENTMCNC: 32.1 GM/DL — SIGNIFICANT CHANGE UP (ref 32–36)
MCV RBC AUTO: 92 FL — SIGNIFICANT CHANGE UP (ref 80–100)
PLATELET # BLD AUTO: 286 K/UL — SIGNIFICANT CHANGE UP (ref 150–400)
POTASSIUM SERPL-MCNC: 4 MMOL/L — SIGNIFICANT CHANGE UP (ref 3.5–5.3)
POTASSIUM SERPL-SCNC: 4 MMOL/L — SIGNIFICANT CHANGE UP (ref 3.5–5.3)
RBC # BLD: 3.76 M/UL — LOW (ref 3.8–5.2)
RBC # FLD: 13.6 % — SIGNIFICANT CHANGE UP (ref 10.3–14.5)
SARS-COV-2 RNA SPEC QL NAA+PROBE: SIGNIFICANT CHANGE UP
SODIUM SERPL-SCNC: 142 MMOL/L — SIGNIFICANT CHANGE UP (ref 135–145)
WBC # BLD: 6.41 K/UL — SIGNIFICANT CHANGE UP (ref 3.8–10.5)
WBC # FLD AUTO: 6.41 K/UL — SIGNIFICANT CHANGE UP (ref 3.8–10.5)

## 2022-02-17 PROCEDURE — 99232 SBSQ HOSP IP/OBS MODERATE 35: CPT

## 2022-02-17 PROCEDURE — 99239 HOSP IP/OBS DSCHRG MGMT >30: CPT

## 2022-02-17 RX ORDER — OXYCODONE HYDROCHLORIDE 5 MG/1
1 TABLET ORAL
Qty: 12 | Refills: 0
Start: 2022-02-17 | End: 2022-02-19

## 2022-02-17 RX ORDER — ERTAPENEM SODIUM 1 G/1
1000 INJECTION, POWDER, LYOPHILIZED, FOR SOLUTION INTRAMUSCULAR; INTRAVENOUS
Qty: 0 | Refills: 0 | DISCHARGE
Start: 2022-02-17 | End: 2022-02-25

## 2022-02-17 RX ADMIN — HEPARIN SODIUM 5000 UNIT(S): 5000 INJECTION INTRAVENOUS; SUBCUTANEOUS at 09:11

## 2022-02-17 RX ADMIN — ERTAPENEM SODIUM 120 MILLIGRAM(S): 1 INJECTION, POWDER, LYOPHILIZED, FOR SOLUTION INTRAMUSCULAR; INTRAVENOUS at 14:04

## 2022-02-17 NOTE — PROGRESS NOTE ADULT - ASSESSMENT
2/16/22:  Pt with above history and recent breast surgery for intraductal CA presenting with an incarcerated right inguinal hernia.  No new cardiac issues.  Other than needing a BM, she is doing well but may need help at home.  No indication for further cardiac testing at this time.  Will follow as treatment progresses and as an outpt as needed.    2/17/22:  Continues to feel good without increased incisional pain, anginal chest pains or increased SOB.  For possible discharge to rehab soon.  No new cardiac issues so far.  Will continue to follow intermittently prn as treatment progresses and as an outpt as needed.

## 2022-02-17 NOTE — DISCHARGE NOTE NURSING/CASE MANAGEMENT/SOCIAL WORK - PATIENT PORTAL LINK FT
You can access the FollowMyHealth Patient Portal offered by [facility] by registering at the following website: [email or web address]. By joining [facility]’s FollowMyHealth portal, you will also be able to view your health information using other applications (apps) compatible with our system.

## 2022-02-17 NOTE — DISCHARGE NOTE PROVIDER - NSDCCPTREATMENT_GEN_ALL_CORE_FT
PRINCIPAL PROCEDURE  Procedure: Repair, femoral hernia  Findings and Treatment:       SECONDARY PROCEDURE  Procedure: Appendectomy  Findings and Treatment:

## 2022-02-17 NOTE — PROGRESS NOTE ADULT - SUBJECTIVE AND OBJECTIVE BOX
CC: 86 yr old lady with incarcerated femoral hernia/abscess/perf appendix s/p appendectomy and femoral hernia repair on 2/14/22  Medicine called for assistance with her medical issues.  At bedside she reports that she feels fine -  denies cp palps sob. post-op pain under control.   Has been ambulating in the hallway   10 point ROS is otherwise neg.  Per discussion with staff,  patient's family thinks she is has paranoia.   Pt is worried that people will steal her belongings and has been barricading herself in the house.  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    2/16 - late note - pt feels well, no cp palps sob; some soreness at surgical site     Vital Signs Last 24 Hrs  T(C): 36.4 (16 Feb 2022 15:15), Max: 36.7 (15 Feb 2022 21:01)  T(F): 97.6 (16 Feb 2022 15:15), Max: 98.1 (15 Feb 2022 21:01)  HR: 90 (16 Feb 2022 15:15) (78 - 93)  BP: 131/44 (16 Feb 2022 15:15) (123/64 - 137/55)  BP(mean): 77 (16 Feb 2022 08:28) (77 - 77)  RR: 18 (16 Feb 2022 15:15) (16 - 18)  SpO2: 99% (16 Feb 2022 15:15) (96% - 99%)  PHYSICAL EXAM:  GENERAL: NAD, able to lie flat in bed  HEAD:  Atraumatic, Normocephalic  EYES: EOMI, PERRLA, normal sclera  ENT: Moist mucous membranes  NECK: Supple, No JVD, no nuchal rigidity  CHEST/LUNG: Clear to auscultation bilaterally; No rales, rhonchi, wheezing, or rubs. Unlabored respirations  HEART: Regular rate and rhythm; No murmurs, rubs, or gallops  ABDOMEN: Bowel sounds present; Soft, Nontender, Nondistended. No hepatomegaly  rt femoral area with dressing  EXTREMITIES:  no pitting bilaterally  NERVOUS SYSTEM:  Alert & Oriented X3, speech clear. No focal motor or sensory deficits  MSK: FROM all 4 extremities, full and equal strength      MEDICATIONS:  MEDICATIONS  (STANDING):  heparin   Injectable 5000 Unit(s) SubCutaneous every 12 hours  melatonin 5 milliGRAM(s) Oral at bedtime  piperacillin/tazobactam IVPB.. 3.375 Gram(s) IV Intermittent every 8 hours  polyethylene glycol 3350 17 Gram(s) Oral daily      LABS: All Labs Reviewed:                      11.3   7.99  )-----------( 274      ( 16 Feb 2022 06:38 )             34.7   142  |  107  |  12  ----------------------------<  122<H>  4.0   |  30  |  0.50  Ca    8.0<L>      16 Feb 2022 06:38  Mg     2.4     02-16    Blood Culture: 02-14 @ 11:52  Organism Escherichia coli ESBL  Gram Stain Blood -- Gram Stain --  Specimen Source .Abscess RIGHT INGUINAL ABCESS  Culture-Blood --    RADIOLOGY/EKG:  < from: CT Abdomen and Pelvis w/ IV Cont (02.11.22 @ 01:36) >  Moderate right femoral hernia containing infiltrated fat concerning for   fat incarceration. Please correlate for reducibility. No bowel herniation.  The hernia contains a mildly distended hyperemic appendix which may be   reactive due to incarceration, however, this should be correlated with   other signs and symptoms such as fever and leukocytosis.         CC:  abdominal discomfort        86 yr old female with PMH of right breast cancer s/p mastectomy, HTN admitted to surgical service for right femoral hernia repair  on 2/14/22 found to have  perforated appendix and abscess in incarcerated hernia sac.    s/p appendectomy and femoral hernia repair on 2/14/22 . Medicine called for assistance with her medical issues.  Per discussion with staff,  patient's family thinks she is has paranoia.  Pt is worried that people will steal her belongings and has been barricading herself in the house.  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    2/17 - pt seen and examined , afebrile, tolerating po intake, afebrile    Review of system- Rest of the review of system are negative except mentioned in HPI    Vitals reviewed for last 24 hours  T(C): 36.2 (02-17-22 @ 15:42), Max: 36.8 (02-16-22 @ 21:06)  T(F): 97.2 (02-17-22 @ 15:42), Max: 98.2 (02-16-22 @ 21:06)  HR: 82 (02-17-22 @ 15:42) (78 - 82)  BP: 128/52 (02-17-22 @ 15:42) (128/46 - 133/50)  RR: 17 (02-17-22 @ 15:42) (17 - 18)  SpO2: 92% (02-17-22 @ 15:42) (92% - 97%)  Wt(kg): --    PHYSICAL EXAM:  GENERAL: NAD, able to lie flat in bed  HEAD:  Atraumatic, Normocephalic  EYES: EOMI, PERRLA, normal sclera  ENT: Moist mucous membranes  NECK: Supple, No JVD, no nuchal rigidity  CHEST/LUNG: Clear to auscultation bilaterally; No rales, rhonchi, wheezing, or rubs. Unlabored respirations  HEART: Regular rate and rhythm; No murmurs, rubs, or gallops  ABDOMEN: Bowel sounds present; Soft, Nontender, Nondistended. No hepatomegaly, + LELE drain  rt femoral area with dressing  EXTREMITIES:  no pitting bilaterally  NERVOUS SYSTEM:  Alert & Oriented X3, speech clear. No focal motor or sensory deficits  MSK: FROM all 4 extremities, full and equal strength      MEDICATIONS  (STANDING):  ertapenem  IVPB      ertapenem  IVPB 1000 milliGRAM(s) IV Intermittent every 24 hours  heparin   Injectable 5000 Unit(s) SubCutaneous every 12 hours  melatonin 5 milliGRAM(s) Oral at bedtime  polyethylene glycol 3350 17 Gram(s) Oral daily    MEDICATIONS  (PRN):  acetaminophen     Tablet .. 325 milliGRAM(s) Oral every 4 hours PRN Temp greater or equal to 38C (100.4F), Mild Pain (1 - 3)  morphine  - Injectable 4 milliGRAM(s) IV Push every 4 hours PRN Severe Pain (7 - 10)  oxycodone    5 mG/acetaminophen 325 mG 1 Tablet(s) Oral every 4 hours PRN Moderate Pain (4 - 6)      LABS: All Labs Reviewed:           02-17    142  |  110<H>  |  10  ----------------------------<  102<H>  4.0   |  29  |  0.35<L>    Ca    8.2<L>      17 Feb 2022 06:32  Mg     2.4     02-17                              11.1   6.41  )-----------( 286      ( 17 Feb 2022 06:32 )             34.6                    11.3   7.99  )-----------( 274      ( 16 Feb 2022 06:38 )             34.7   142  |  107  |  12  ----------------------------<  122<H>  4.0   |  30  |  0.50  Ca    8.0<L>      16 Feb 2022 06:38  Mg     2.4     02-16    Blood Culture: 02-14 @ 11:52  Organism Escherichia coli ESBL  Gram Stain Blood -- Gram Stain --  Specimen Source .Abscess RIGHT INGUINAL ABCESS  Culture-Blood --    RADIOLOGY/EKG:  < from: CT Abdomen and Pelvis w/ IV Cont (02.11.22 @ 01:36) >  Moderate right femoral hernia containing infiltrated fat concerning for   fat incarceration. Please correlate for reducibility. No bowel herniation.  The hernia contains a mildly distended hyperemic appendix which may be   reactive due to incarceration, however, this should be correlated with   other signs and symptoms such as fever and leukocytosis.

## 2022-02-17 NOTE — DISCHARGE NOTE NURSING/CASE MANAGEMENT/SOCIAL WORK - NSDCPEFALRISK_GEN_ALL_CORE
For information on Fall & Injury Prevention, visit: [email or web address] OR  [email or web address] OR  https://www.cdc.gov/steadi/patient.html

## 2022-02-17 NOTE — DISCHARGE NOTE PROVIDER - HOSPITAL COURSE
87 y/o female with h/o HTN, right breast Ca s/p mastectomy presented to  ED on 2/12 for abd pain found to have fat containing right inguinal hernia, discharged and returned for   ASU on 2/14 for R femoral hernia repair, intra-op found to have perforated appendix and abscess in incarcerated hernia sac. Post op course from Surgical perspective uneventful,  diet advanced. Patient was consulted by Neurology over concerns of paranoia possible dementia, the patient was found to have no signs or symptoms of dementia have been noted per Neuro.  Hospitalist and Cardiology consults also evaluated patient, no acute interventions and no changes or new meds recommended for the patient. Post op she was started on zosyn after her surgery, on 2/17 she was reported with MDRO in surgical culture, Ertapenem given in hospital x1 dose, initial plan to place patient at [facility]; however, due to need for IV antibiotics,  facilitated ANIBAL placement.

## 2022-02-17 NOTE — CONSULT NOTE ADULT - CONSULT REASON
abx management
med co-mx
son with concerns about patient living at home alone, she has been taping door knobs and paranoid.
PCP and cardiac follow up

## 2022-02-17 NOTE — CONSULT NOTE ADULT - SUBJECTIVE AND OBJECTIVE BOX
Patient is a 86y old  Female who presents with a chief complaint of incarcerated inguinal hernia for surgery     HPI:  85 y/o female with h/o HTN, right breast Ca s/p mastectomy was admitted on 2/14 for abdominal pain. Workup showed a perforated appendix and abscess in incarcerated hernia sac. She underwent appendectomy and femoral hernia repair on 2/14/22. She was started on zosyn after her surgery. On 2/17 she was reported with MDRO in surgical culture.     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 325 milliGRAM(s) Oral every 4 hours PRN Temp greater or equal to 38C (100.4F), Mild Pain (1 - 3)  morphine  - Injectable 4 milliGRAM(s) IV Push every 4 hours PRN Severe Pain (7 - 10)  oxycodone    5 mG/acetaminophen 325 mG 1 Tablet(s) Oral every 4 hours PRN Moderate Pain (4 - 6)    Allergies    Boric Acid (Urticaria)    Intolerances      Social: no smoking, no alcohol, no illegal drugs; no recent travel, no exposure to TB  FAMILY HISTORY:  Family history of coronary artery disease    Family history of von Willebrand disease (Sibling)      no history of premature cardiovascular disease in first degree relatives    ROS: the patient denies fever, no chills, no HA, no seizures, no dizziness, no sore throat, no nasal congestion, no blurry vision, no CP, no palpitations, no SOB, no cough, has lower abdominal pain, no diarrhea, no N/V, no dysuria, no leg pain, no claudication, no rash, no joint aches, no rectal pain or bleeding, no night sweats  All other systems reviewed and are negative    Vital Signs Last 24 Hrs  T(C): 36.8 (16 Feb 2022 21:06), Max: 36.8 (16 Feb 2022 21:06)  T(F): 98.2 (16 Feb 2022 21:06), Max: 98.2 (16 Feb 2022 21:06)  HR: 82 (16 Feb 2022 21:06) (82 - 90)  BP: 128/46 (16 Feb 2022 21:06) (128/46 - 131/44)  BP(mean): --  RR: 18 (16 Feb 2022 21:06) (18 - 18)  SpO2: 97% (16 Feb 2022 21:06) (97% - 99%)  Daily     Daily     PE:    Constitutional:  No acute distress  HEENT: NC/AT, EOMI, PERRLA, conjunctivae clear; ears and nose atraumatic; pharynx benign  Neck: supple; thyroid not palpable  Back: no tenderness  Respiratory: respiratory effort normal; clear to auscultation  Cardiovascular: S1S2 regular, no murmurs  Abdomen: soft, has RLQ tender, not distended, positive BS; no liver or spleen organomegaly  postsurgical wounds clear  Genitourinary: no suprapubic tenderness  Lymphatic: no LN palpable  Musculoskeletal: no muscle tenderness, no joint swelling or tenderness  Extremities: no pedal edema  Neurological/ Psychiatric: AxOx3, judgement and insight normal; moving all extremities  Skin: no rashes; no palpable lesions    Labs: all available labs reviewed                        11.1   6.41  )-----------( 286      ( 17 Feb 2022 06:32 )             34.6     02-17    142  |  110<H>  |  10  ----------------------------<  102<H>  4.0   |  29  |  0.35<L>    Ca    8.2<L>      17 Feb 2022 06:32  Mg     2.4     02-17      Culture - Fungal, Other (collected 14 Feb 2022 11:52)  Source: .Other RIGHT INGUINAL ABSCESS  Preliminary Report (15 Feb 2022 08:02):    Testing in progress    Culture - Abscess with Gram Stain (collected 14 Feb 2022 11:52)  Source: .Abscess RIGHT INGUINAL ABCESS  Final Report (16 Feb 2022 17:35):    Numerous Escherichia coli ESBL    Few Enterococcus faecium    Numerous Bacteroides ovatus group "Susceptibilities not performed"  Organism: Escherichia coli ESBL  Enterococcus faecium (16 Feb 2022 17:35)  Organism: Enterococcus faecium (16 Feb 2022 17:35)      -  Ampicillin: S <=2 Predicts results to ampicillin/sulbactam, amoxacillin-clavulanate and  piperacillin-tazobactam.      -  Tetra/Doxy: R >8      -  Vancomycin: S 0.5      Method Type: CATALINA  Organism: Escherichia coli ESBL (16 Feb 2022 17:35)      -  Amikacin: S <=16      -  Amoxicillin/Clavulanic Acid: S <=8/4      -  Ampicillin: R >16 These ampicillin results predict results for amoxicillin      -  Ampicillin/Sulbactam: R 8/4 Enterobacter, Klebsiella aerogenes, Citrobacter, and Serratia may develop resistance during prolonged therapy (3-4 days)      -  Aztreonam: R >16      -  Cefazolin: R >16 Enterobacter, Klebsiella aerogenes, Citrobacter, and Serratia may develop resistance during prolonged therapy (3-4 days)      -  Cefepime: R 16      -  Cefoxitin: S <=8      -  Ceftriaxone: R >32 Enterobacter, Klebsiella aerogenes, Citrobacter, and Serratia may develop resistance during prolonged therapy      -  Ciprofloxacin: R >2      -  Ertapenem: S <=0.5      -  Gentamicin: R >8      -  Imipenem: S <=1      -  Levofloxacin: R >4      -  Meropenem: S <=1      -  Piperacillin/Tazobactam: R <=8      -  Tobramycin: R >8      -  Trimethoprim/Sulfamethoxazole: S <=0.5/9.5      Method Type: CATALINA    Culture - Urine (collected 10 Feb 2022 23:04)  Source: Clean Catch Clean Catch (Midstream)  Final Report (12 Feb 2022 09:40):    <10,000 CFU/mL Normal Urogenital Venita      Radiology: all available radiological tests reviewed    Advanced directives addressed: full resuscitation

## 2022-02-17 NOTE — CONSULT NOTE ADULT - ASSESSMENT
85 y/o female with h/o HTN, right breast Ca s/p mastectomy was admitted on 2/14 for abdominal pain. Workup showed a perforated appendix and abscess in incarcerated hernia sac. She underwent appendectomy and femoral hernia repair on 2/14/22. She was started on zosyn after her surgery. On 2/17 she was reported with MDRO in surgical culture.     1. Acute perforated appendicitis with abscess in incarcerated hernia sac s/p appendectomy with ESBL E.coli, ENFAE and BAOV.  -no clinical evidence of sepsis  -cultures reviewed  -s/p zosyn  -start ertapenem 1 gm IV qd  -reason for abx use and side effects reviewed with patient; monitor BMP   -plan to continue IV abx therapy for approx 10 more days depending on her progress  -monitor abdomem-may need midline  -old chart reviewed to assess prior cultures  -monitor temps  -f/u CBC  -supportive care  2. Other issues:   -care per medicine    d/w surgery

## 2022-02-17 NOTE — DISCHARGE NOTE PROVIDER - NSDCCPCAREPLAN_GEN_ALL_CORE_FT
PRINCIPAL DISCHARGE DIAGNOSIS  Diagnosis: Incarcerated right inguinal hernia  Assessment and Plan of Treatment:       SECONDARY DISCHARGE DIAGNOSES  Diagnosis: Perforated appendicitis  Assessment and Plan of Treatment:

## 2022-02-17 NOTE — PROGRESS NOTE ADULT - SUBJECTIVE AND OBJECTIVE BOX
CHIEF COMPLAINT:  Patient is a 86y old  Female who presents with a chief complaint of right lower abd pain from incarcerated right inguinal hernia.    HPI: 22:  86 yr old female, well known to me with h/o HTN and recent right breast surgery for intraductal CA presenting with an incarcerated femoral hernia/abscess/perf appendix s/p appendectomy and femoral hernia repair on 22.  Currently she reports that she feels fine -  She denies cp, palpitation or increased SOB.  Post-op pain is under control.  She has been ambulating in the hallway without issues.  She denies having had a BM since admission but is passing flatus.  She had a recent PET nuclear stress test (-) for ischemia and an Echo showing normal LV systolic function with LVEF=60-65% with mild (1+) MR and TR but otherwise a normal study.  She has no new cardiac symptoms.    22:  Continues to feel good without increased incisional pain, anginal chest pains or increased SOB.  For possible discharge to rehab soon.  No new cardiac issues so far.        PMHx:  PAST MEDICAL & SURGICAL HISTORY:  Arthritis  Hip pain, acute, right  Hypertension  Hepatitis B-resolved  Shingles  Ductal carcinoma of breast-right breast  S/P cataract extraction, unspecified laterality-bilateral  S/P hip replacement, right      FAMILY HISTORY:   FAMILY HISTORY:  Family history of coronary artery disease  Family history of von Willebrand disease (Sibling)      ALLERGIES:  Allergies  Boric Acid (Urticaria)      REVIEW OF SYSTEMS:  10 point ROS was obtained  Pertinent positives and negatives are as above  All other review of systems is negative unless indicated above      Vital Signs Last 24 Hrs  T(C): 36.8 (2022 21:06), Max: 36.8 (2022 21:06)  T(F): 98.2 (2022 21:06), Max: 98.2 (2022 21:06)  HR: 82 (2022 21:06) (78 - 90)  BP: 128/46 (2022 21:06) (123/64 - 131/44)  BP(mean): 77 (2022 08:28) (77 - 77)  RR: 18 (2022 21:06) (18 - 18)  SpO2: 97% (2022 21:06) (97% - 99%)      I&O's Summary    15 Feb 2022 07:01  -  2022 07:00  --------------------------------------------------------  IN: 480 mL / OUT: 25 mL / NET: 455 mL      PHYSICAL EXAM:   Constitutional: NAD, awake and alert, well-developed  HEENT: PERR, EOMI, Normal Hearing, MMM  Neck: Soft and supple, No LAD, No JVD  Respiratory: Breath sounds are clear bilaterally, No wheezing, rales or rhonchi  Cardiovascular: S1 and S2, regular rate and rhythm, soft CORINNA at LLSB and base as before, no gallops or rubs  Gastrointestinal: Bowel Sounds present, soft, nontender, nondistended, no guarding, no rebound  Extremities: No peripheral edema  Vascular: 2+ peripheral pulses  Neurological: A/O x 3, no focal deficits  Musculoskeletal: 5/5 strength b/l upper and lower extremities  Skin: No rashes      MEDICATIONS  (STANDING):  ertapenem  IVPB      ertapenem  IVPB 1000 milliGRAM(s) IV Intermittent every 24 hours  heparin   Injectable 5000 Unit(s) SubCutaneous every 12 hours  melatonin 5 milliGRAM(s) Oral at bedtime  polyethylene glycol 3350 17 Gram(s) Oral daily    MEDICATIONS  (PRN):  acetaminophen     Tablet .. 325 milliGRAM(s) Oral every 4 hours PRN Temp greater or equal to 38C (100.4F), Mild Pain (1 - 3)  morphine  - Injectable 4 milliGRAM(s) IV Push every 4 hours PRN Severe Pain (7 - 10)  oxycodone    5 mG/acetaminophen 325 mG 1 Tablet(s) Oral every 4 hours PRN Moderate Pain (4 - 6)      LABS: All Labs Reviewed:                        11.3   7.99  )-----------( 274      ( 2022 06:38 )             34.7     02-16    142  |  107  |  12  ----------------------------<  122<H>  4.0   |  30  |  0.50    Ca    8.0<L>      2022 06:38      BLOOD CULTURES: Organism --  Gram Stain Inguinal Abscess -- Gram Stain --  Specimen Source .Abscess RIGHT INGUINAL ABCESS  Culture-Result --Numerous Escherichia coli       LIPID PROFILE     RADIOLOGY:    CT of Abd & Pelvis: 22:  FINDINGS:  LOWER CHEST: Within normal limits.  LIVER: Within normal limits.  BILE DUCTS: Normal caliber.  GALLBLADDER: Within normal limits.  SPLEEN: Hypodense splenic lesions may represent cysts versus hemangiomas.  PANCREAS: Within normal limits.  ADRENALS: Within normal limits.  KIDNEYS/URETERS: Bilateral parapelvic renal cysts and additional subcentimeter hypodense lesions that are too small to characterize. Symmetric enhancement. No hydronephrosis.  BLADDER: Within normal limits.  REPRODUCTIVE ORGANS: Uterus and adnexa within normal limits.  BOWEL: No bowel obstruction. Diverticulosis coli. Appendiceal hyperemia and mild dilatation up to 8 mm. Appendix is contained within a right femoral hernia.  PERITONEUM: No ascites.  VESSELS: Atherosclerotic changes.  RETROPERITONEUM/LYMPH NODES: No lymphadenopathy.  ABDOMINAL WALL: Moderate right femoral hernia containing infiltrated fat as well as a mildly distended appendix which demonstrates a hyperemic wall..  BONES: Degenerative changes.  IMPRESSION:  Moderate right femoral hernia containing infiltrated fat concerning for fat incarceration. Please correlate for reducibility. No bowel herniation.  The hernia contains a mildly distended hyperemic appendix which may be reactive due to incarceration, however, this should be correlated with other signs and symptoms such as fever and leukocytosis.      EK/10/22:  Sinus rhythm with Premature atrial complexes  Septal infarct , age undetermined  Nonspecific T wave abnormality now evident in Anterior leads      TELEMETRY:      ECHO:  Recent PET nuclear stress test (-) for ischemia and Echo showed normal LV systolic function with LVEF=60-65% with mild (1+) MR and TR but otherwise a normal study.

## 2022-02-17 NOTE — DISCHARGE NOTE PROVIDER - NSDCMRMEDTOKEN_GEN_ALL_CORE_FT
Calcium 600+D oral tablet: 1 tab(s) orally once a day  ertapenem 1 g injection: 1000 milligram(s) injectable once a day  hydroCHLOROthiazide 25 mg oral tablet: 1 tab(s) orally once a day  MiraLax oral powder for reconstitution: 17 gram(s) orally once a day   Multiple Vitamins oral tablet: 1 tab(s) orally once a day  Povidine 1% topical solution:   Vitamin C 500 mg oral tablet: 1 tab(s) orally once a day

## 2022-02-17 NOTE — ADVANCED PRACTICE NURSE CONSULT - RECOMMEDATIONS
Pt confirmed using two identifiers (name and ). Pt is alert and oriented and consented to procedure. Reviewed chart and provider orders. Pt prepped in sterile fashion. Using strict aseptic technique that was maintained throughout procedure, guided by ultrasound, Arrow Endurance extended dwell peripheral midline catheter Lot#[id number], 20g, 8cm, was placed in the left brachial vein as per MD order. Brisk blood return observed and flushed with 20cc NS. Stat lock and Biopatch applied. Site dressed and Curos cap applied.  Pt tolerated procedure well. Midline care instructions were placed in chart. All questions answered. Do Not Use Extremity band applied to left wrist. Report given to primary RN.

## 2022-02-17 NOTE — DISCHARGE NOTE PROVIDER - NSDCACTIVITY_GEN_ALL_CORE
No restrictions/Do not drive or operate machinery/Showering allowed/Do not make important decisions/Driving allowed

## 2022-02-17 NOTE — DISCHARGE NOTE PROVIDER - CARE PROVIDER_API CALL
Kelechi Chanel  SURGERY  [address] [place] [address]  Phone: [phone]  Fax: [phone]  Follow Up Time: 1 week

## 2022-02-21 DIAGNOSIS — Z85.3 PERSONAL HISTORY OF MALIGNANT NEOPLASM OF BREAST: ICD-10-CM

## 2022-02-21 DIAGNOSIS — I10 ESSENTIAL (PRIMARY) HYPERTENSION: ICD-10-CM

## 2022-02-21 DIAGNOSIS — B96.20 UNSPECIFIED ESCHERICHIA COLI [E. COLI] AS THE CAUSE OF DISEASES CLASSIFIED ELSEWHERE: ICD-10-CM

## 2022-02-21 DIAGNOSIS — Z16.12 EXTENDED SPECTRUM BETA LACTAMASE (ESBL) RESISTANCE: ICD-10-CM

## 2022-02-21 DIAGNOSIS — F41.9 ANXIETY DISORDER, UNSPECIFIED: ICD-10-CM

## 2022-02-21 DIAGNOSIS — K35.33 ACUTE APPENDICITIS WITH PERFORATION, LOCALIZED PERITONITIS, AND GANGRENE, WITH ABSCESS: ICD-10-CM

## 2022-02-21 DIAGNOSIS — I34.0 NONRHEUMATIC MITRAL (VALVE) INSUFFICIENCY: ICD-10-CM

## 2022-02-21 DIAGNOSIS — E87.6 HYPOKALEMIA: ICD-10-CM

## 2022-02-21 DIAGNOSIS — K41.30 UNILATERAL FEMORAL HERNIA, WITH OBSTRUCTION, WITHOUT GANGRENE, NOT SPECIFIED AS RECURRENT: ICD-10-CM

## 2022-02-21 DIAGNOSIS — Z96.641 PRESENCE OF RIGHT ARTIFICIAL HIP JOINT: ICD-10-CM

## 2022-02-24 LAB
GAMMA INTERFERON BACKGROUND BLD IA-ACNC: 0.04 IU/ML — SIGNIFICANT CHANGE UP
M TB IFN-G BLD-IMP: NEGATIVE — SIGNIFICANT CHANGE UP
M TB IFN-G CD4+ BCKGRND COR BLD-ACNC: -0.04 IU/ML — SIGNIFICANT CHANGE UP
M TB IFN-G CD4+CD8+ BCKGRND COR BLD-ACNC: -0.04 IU/ML — SIGNIFICANT CHANGE UP
QUANT TB PLUS MITOGEN MINUS NIL: 1.35 IU/ML — SIGNIFICANT CHANGE UP

## 2022-03-14 ENCOUNTER — NON-APPOINTMENT (OUTPATIENT)
Age: 87
End: 2022-03-14

## 2022-05-19 ENCOUNTER — APPOINTMENT (OUTPATIENT)
Dept: BREAST CENTER | Facility: CLINIC | Age: 87
End: 2022-05-19
Payer: MEDICARE

## 2022-05-19 VITALS
BODY MASS INDEX: 21.53 KG/M2 | SYSTOLIC BLOOD PRESSURE: 155 MMHG | WEIGHT: 120 LBS | HEIGHT: 62.5 IN | HEART RATE: 69 BPM | DIASTOLIC BLOOD PRESSURE: 65 MMHG

## 2022-05-19 DIAGNOSIS — R92.1 MAMMOGRAPHIC CALCIFICATION FOUND ON DIAGNOSTIC IMAGING OF BREAST: ICD-10-CM

## 2022-05-19 DIAGNOSIS — Z85.3 PERSONAL HISTORY OF MALIGNANT NEOPLASM OF BREAST: ICD-10-CM

## 2022-05-19 DIAGNOSIS — Z12.39 ENCOUNTER FOR OTHER SCREENING FOR MALIGNANT NEOPLASM OF BREAST: ICD-10-CM

## 2022-05-19 PROCEDURE — 99214 OFFICE O/P EST MOD 30 MIN: CPT

## 2022-05-19 NOTE — DATA REVIEWED
[FreeTextEntry1] : B/l mammogram and US 8/12/21\par - heterogenously dense\par - calcifications in R UOQ\par - 0.3 cm nodule in R breast 11:00 N1, new\par - 0.2 cm calcified nodule in L breast 12:00 N5 with mammographic correlate\par - BIRADS 0\par \par R mammogram and US 9/2/21\par - calcifications in R UOQ are loose aggregate, punctate, round; 6 mo R mammogram\par - 0.4 x 0.4 x 0.5 cm irregular nodule in R breast 11:00 N1; US bx\par - BIRADS 4\par \par US bx 9/13/21\par - R breast 11:00 N1, coil clip = DCIS, low grade, ER 90%, MD 80%\par \par Breast MRI 9/27/21\par - bx clip in R  central breast with enhancing foci 1 cm anterior and 0.3 cm inferior to clip, and indeterminate enhancing focus 1.6 cm posterior to bx clip; wide excision\par - 3.5 cm linear enhancement in L  lateral slightly inferior breast; MR bx\par - 0.4 cm enhancing lesion in L breast 12-1:00 N5; MR bx\par - 0.5 cm linear enhancement in L retroareolar far posterior breast; unable to do MR bx due to location\par \par Attempted MR bx on 10/21/21, but patient did not tolerate positioning.\par \par US and US needle bx 11/4/21\par - 0.5 x 0.2 x 0.3 cm nodule in L breast 1:00 N4; possible correlate to MR finding in L breast 12-1:00; needle bx, coil clip = ductal hyperplasia, stromal fibrosis; concordant; 1 year US\par - no sonographic correlate for enhancement in L lower lateral breast; MR bx\par \par MR needle bx 11/11/21\par - L LOQ, cork clip is 4 cm medially displaced = fatty tissue, stromal fibrosis\par - L 12-1:00 posterior, stoplight clip is 4 cm medially displaced = fatty tissue, stromal fibrosis\par - concordant; 1 yr MRI\par \par Surgical pathology 1/3/22\par - R lumpectomy = DCIS, 0.5 cm

## 2022-05-19 NOTE — PHYSICAL EXAM
[Normocephalic] : normocephalic [Atraumatic] : atraumatic [Sclera nonicteric] : sclera nonicteric [Supple] : supple [No Supraclavicular Adenopathy] : no supraclavicular adenopathy [No Cervical Adenopathy] : no cervical adenopathy [Clear to Auscultation Bilat] : clear to auscultation bilaterally [Normal Sinus Rhythm] : normal sinus rhythm [Examined in the supine and seated position] : examined in the supine and seated position [Symmetrical] : symmetrical [Bra Size: ___] : Bra Size: [unfilled] [No dominant masses] : no dominant masses in right breast  [No dominant masses] : no dominant masses left breast [No Nipple Retraction] : no left nipple retraction [No Nipple Discharge] : no left nipple discharge [No Edema] : no edema [No Rashes] : no rashes [No Ulceration] : no ulceration [No Axillary Lymphadenopathy] : no left axillary lymphadenopathy [Soft] : abdomen soft [de-identified] : Superior periareolar scar

## 2022-06-28 ENCOUNTER — APPOINTMENT (OUTPATIENT)
Dept: ULTRASOUND IMAGING | Facility: CLINIC | Age: 87
End: 2022-06-28
Payer: MEDICARE

## 2022-06-28 ENCOUNTER — RESULT REVIEW (OUTPATIENT)
Age: 87
End: 2022-06-28

## 2022-06-28 ENCOUNTER — OUTPATIENT (OUTPATIENT)
Dept: OUTPATIENT SERVICES | Facility: HOSPITAL | Age: 87
LOS: 1 days | End: 2022-06-28
Payer: MEDICARE

## 2022-06-28 ENCOUNTER — APPOINTMENT (OUTPATIENT)
Dept: MAMMOGRAPHY | Facility: CLINIC | Age: 87
End: 2022-06-28
Payer: MEDICARE

## 2022-06-28 DIAGNOSIS — Z96.641 PRESENCE OF RIGHT ARTIFICIAL HIP JOINT: Chronic | ICD-10-CM

## 2022-06-28 DIAGNOSIS — Z12.39 ENCOUNTER FOR OTHER SCREENING FOR MALIGNANT NEOPLASM OF BREAST: ICD-10-CM

## 2022-06-28 DIAGNOSIS — R92.1 MAMMOGRAPHIC CALCIFICATION FOUND ON DIAGNOSTIC IMAGING OF BREAST: ICD-10-CM

## 2022-06-28 DIAGNOSIS — Z98.49 CATARACT EXTRACTION STATUS, UNSPECIFIED EYE: Chronic | ICD-10-CM

## 2022-06-28 DIAGNOSIS — Z85.3 PERSONAL HISTORY OF MALIGNANT NEOPLASM OF BREAST: ICD-10-CM

## 2022-06-28 PROCEDURE — 77066 DX MAMMO INCL CAD BI: CPT | Mod: 26

## 2022-06-28 PROCEDURE — G0279: CPT | Mod: 26

## 2022-06-28 PROCEDURE — 76641 ULTRASOUND BREAST COMPLETE: CPT | Mod: 26,50

## 2022-06-28 PROCEDURE — 76641 ULTRASOUND BREAST COMPLETE: CPT

## 2022-06-28 PROCEDURE — G0279: CPT

## 2022-06-28 PROCEDURE — 77066 DX MAMMO INCL CAD BI: CPT

## 2022-06-29 ENCOUNTER — OUTPATIENT (OUTPATIENT)
Dept: OUTPATIENT SERVICES | Facility: HOSPITAL | Age: 87
LOS: 1 days | Discharge: HOME | End: 2022-06-29

## 2022-06-29 DIAGNOSIS — D05.11 INTRADUCTAL CARCINOMA IN SITU OF RIGHT BREAST: ICD-10-CM

## 2022-06-29 DIAGNOSIS — Z96.641 PRESENCE OF RIGHT ARTIFICIAL HIP JOINT: Chronic | ICD-10-CM

## 2022-06-29 DIAGNOSIS — N63.0 UNSPECIFIED LUMP IN UNSPECIFIED BREAST: ICD-10-CM

## 2022-06-29 DIAGNOSIS — Z98.49 CATARACT EXTRACTION STATUS, UNSPECIFIED EYE: Chronic | ICD-10-CM

## 2022-07-01 NOTE — HISTORY OF PRESENT ILLNESS
[FreeTextEntry1] : Ms. 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 She saw the Radiation Oncologist, the DCISionRT score was 3.7 (elevated range is 3.0-10.0), and she elected not to have radiation. She has no breast complaints. 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 Her family history is not significant for any breast cancer. 
120

## 2022-07-04 NOTE — HISTORY OF PRESENT ILLNESS
[de-identified] : Referred by: Dr. Jocelin Canela \par PCP: \par Diagnosis: \par \par Ms. Fernández is a post-menopausal female who presented at age 87 in 2022 for evaluation of R breast DCIS. \par The patient has a medical history of ***\par \par DCISionRT score was 3.7 (elevated range is 3.0-10.0), and she elected not to have radiation. \par \par She is on waiting list for assisted living in [place].  passed away 1 month ago. \par \par Imaging: \par B/l mammogram and US 21- heterogenously dense, calcifications in R UOQ, 0.3 cm nodule in R breast 11:00 N1, new, 0.2 cm calcified nodule in L breast 12:00 N5 with mammographic correlate, BIRADS 0\par R mammogram and US 21- calcifications in R UOQ are loose aggregate, punctate, round; 6 mo R mammogram, 0.4 x 0.4 x 0.5 cm irregular nodule in R breast 11:00 N1; US bx, BIRADS 4\par US bx 21- R breast 11:00 N1, coil clip = DCIS, low grade, ER 90%, IL 80%\par Breast MRI 21- bx clip in R central breast with enhancing foci 1 cm anterior and 0.3 cm inferior to clip, and indeterminate enhancing focus 1.6 cm posterior to bx clip; wide excision, 3.5 cm linear enhancement in L lateral slightly inferior breast; MR bx, 0.4 cm enhancing lesion in L breast 12-1:00 N5; MR bx\par - 0.5 cm linear enhancement in L retroareolar far posterior breast; unable to do MR bx due to location\par US and US needle bx 21- 0.5 x 0.2 x 0.3 cm nodule in L breast 1:00 N4; possible correlate to MR finding in L breast 12-1:00; needle bx, coil clip = ductal hyperplasia, stromal fibrosis; concordant; 1 year US, no sonographic correlate for enhancement in L lower lateral breast; MR bx\par MR needle bx 21\par - L LOQ, cork clip is 4 cm medially displaced = fatty tissue, stromal fibrosis\par - L 12-1:00 posterior, stoplight clip is 4 cm medially displaced = fatty tissue, stromal fibrosis\par - concordant; 1 yr MRI\par \par Path: \par R breast biopsy 21: DCIS, low-intermediate nuclear grade, detached clusters of malignant cells noted within blood clots, ER >90%, IL 80%, DCIS present in multiple tissue cores and in multiple foci\par L breast biopsy 21: ductal hyperplasia, non-atypical, stromal fibrosis, negative for malignancy \par Additional L breast biopsies 21: fatty tissue, stromal fibrosis\par R breast lumpectomy 1/3/22- DCIS, solid, cribiform and papillary types, with intermediate grade, focal sclerosis and microcalcifications, no invasive component, margins negative \par \par \par Genetics: [facility] panel 21- 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 Her family history is not significant for any breast cancer. \par \par \par HCM: \par - COVID vaccination: \par - Colonoscopy: \par - Gyn: \par - Mammo: \par - Lung cancer screen: \par \par OB/GYN Hx: \par - Age at menarche: 17\par - Age at menopause: 52\par - Prior pregnancies:\par - Age at live birth: 37\par - Fertility treatments: \par - Birth control use: \par - Breast feeding history: 4 mo\par - Hormonal therapy:\par \par SH: \par - Occupation:\par - Living situation: \par - Smoking/etoh/illicits: \par - Exercise: \par \par FH: \par - \par - \par \par Discuss arimidex \par DEXA \par calcium vitamin D

## 2022-07-05 ENCOUNTER — APPOINTMENT (OUTPATIENT)
Dept: HEMATOLOGY ONCOLOGY | Facility: CLINIC | Age: 87
End: 2022-07-05

## 2022-07-08 ENCOUNTER — APPOINTMENT (OUTPATIENT)
Dept: ULTRASOUND IMAGING | Facility: CLINIC | Age: 87
End: 2022-07-08

## 2022-07-08 ENCOUNTER — RESULT REVIEW (OUTPATIENT)
Age: 87
End: 2022-07-08

## 2022-07-08 ENCOUNTER — OUTPATIENT (OUTPATIENT)
Dept: OUTPATIENT SERVICES | Facility: HOSPITAL | Age: 87
LOS: 1 days | End: 2022-07-08
Payer: MEDICARE

## 2022-07-08 DIAGNOSIS — N63.0 UNSPECIFIED LUMP IN UNSPECIFIED BREAST: ICD-10-CM

## 2022-07-08 DIAGNOSIS — Z98.49 CATARACT EXTRACTION STATUS, UNSPECIFIED EYE: Chronic | ICD-10-CM

## 2022-07-08 DIAGNOSIS — Z96.641 PRESENCE OF RIGHT ARTIFICIAL HIP JOINT: Chronic | ICD-10-CM

## 2022-07-08 DIAGNOSIS — Z85.3 PERSONAL HISTORY OF MALIGNANT NEOPLASM OF BREAST: ICD-10-CM

## 2022-07-08 PROCEDURE — 88305 TISSUE EXAM BY PATHOLOGIST: CPT

## 2022-07-08 PROCEDURE — 77065 DX MAMMO INCL CAD UNI: CPT

## 2022-07-08 PROCEDURE — 88360 TUMOR IMMUNOHISTOCHEM/MANUAL: CPT | Mod: 26

## 2022-07-08 PROCEDURE — 88305 TISSUE EXAM BY PATHOLOGIST: CPT | Mod: 26

## 2022-07-08 PROCEDURE — 88360 TUMOR IMMUNOHISTOCHEM/MANUAL: CPT

## 2022-07-08 PROCEDURE — 19083 BX BREAST 1ST LESION US IMAG: CPT

## 2022-07-08 PROCEDURE — 77065 DX MAMMO INCL CAD UNI: CPT | Mod: 26,LT

## 2022-07-08 PROCEDURE — A4648: CPT

## 2022-07-08 PROCEDURE — 19083 BX BREAST 1ST LESION US IMAG: CPT | Mod: LT

## 2022-07-12 ENCOUNTER — NON-APPOINTMENT (OUTPATIENT)
Age: 87
End: 2022-07-12

## 2022-07-12 ENCOUNTER — APPOINTMENT (OUTPATIENT)
Dept: BREAST CENTER | Facility: CLINIC | Age: 87
End: 2022-07-12

## 2022-07-12 VITALS
BODY MASS INDEX: 21.53 KG/M2 | WEIGHT: 120 LBS | SYSTOLIC BLOOD PRESSURE: 143 MMHG | HEIGHT: 62.5 IN | HEART RATE: 72 BPM | DIASTOLIC BLOOD PRESSURE: 66 MMHG

## 2022-07-12 DIAGNOSIS — R92.8 OTHER ABNORMAL AND INCONCLUSIVE FINDINGS ON DIAGNOSTIC IMAGING OF BREAST: ICD-10-CM

## 2022-07-12 DIAGNOSIS — Z01.818 ENCOUNTER FOR OTHER PREPROCEDURAL EXAMINATION: ICD-10-CM

## 2022-07-12 PROCEDURE — 99215 OFFICE O/P EST HI 40 MIN: CPT

## 2022-07-12 NOTE — CONSULT LETTER
[Dear  ___] : Dear  [unfilled], [Courtesy Letter:] : I had the pleasure of seeing your patient, [unfilled], in my office today. [Please see my note below.] : Please see my note below. [Consult Closing:] : Thank you very much for allowing me to participate in the care of this patient.  If you have any questions, please do not hesitate to contact me. [Sincerely,] : Sincerely, [FreeTextEntry3] : Jocelin Canela MD FACS

## 2022-07-12 NOTE — PHYSICAL EXAM
[Normocephalic] : normocephalic [Atraumatic] : atraumatic [Sclera nonicteric] : sclera nonicteric [Examined in the supine and seated position] : examined in the supine and seated position [Symmetrical] : symmetrical [Bra Size: ___] : Bra Size: [unfilled] [No dominant masses] : no dominant masses in right breast  [No dominant masses] : no dominant masses left breast [No Nipple Retraction] : no left nipple retraction [No Nipple Discharge] : no left nipple discharge [No Axillary Lymphadenopathy] : no left axillary lymphadenopathy [Soft] : abdomen soft [No Edema] : no edema [No Rashes] : no rashes [No Ulceration] : no ulceration [de-identified] : Superior periareolar scar [de-identified] : Post-biopsy ecchymosis in UOQ

## 2022-07-12 NOTE — HISTORY OF PRESENT ILLNESS
[FreeTextEntry1] : Ms. 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 \par \par She is s/p lumpectomy for a right breast DCIS (1/3/22), had a DCISionRT score of 3.7 (elevated range is 3.0-10.0) and elected not to have radiation. \par \par She has no breast symptoms. She was to see Dr. Mcfadden regarding anti-hormonal therapy but postponed the visit to know about the results of the left breast biopsy first. 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 Her family history is not significant for any breast cancer.

## 2022-07-12 NOTE — DATA REVIEWED
[FreeTextEntry1] : B/l mammogram and US 8/12/21\par - heterogenously dense\par - calcifications in R UOQ\par - 0.3 cm nodule in R breast 11:00 N1, new\par - 0.2 cm calcified nodule in L breast 12:00 N5 with mammographic correlate\par - BIRADS 0\par \par R mammogram and US 9/2/21\par - calcifications in R UOQ are loose aggregate, punctate, round; 6 mo R mammogram\par - 0.4 x 0.4 x 0.5 cm irregular nodule in R breast 11:00 N1; US bx\par - BIRADS 4\par \par US bx 9/13/21\par - R breast 11:00 N1, coil clip = DCIS, low grade, ER 90%, MS 80%\par \par Breast MRI 9/27/21\par - bx clip in R  central breast with enhancing foci 1 cm anterior and 0.3 cm inferior to clip, and indeterminate enhancing focus 1.6 cm posterior to bx clip; wide excision\par - 3.5 cm linear enhancement in L  lateral slightly inferior breast; MR bx\par - 0.4 cm enhancing lesion in L breast 12-1:00 N5; MR bx\par - 0.5 cm linear enhancement in L retroareolar far posterior breast; unable to do MR bx due to location\par \par Attempted MR bx on 10/21/21, but patient did not tolerate positioning.\par \par US and US needle bx 11/4/21\par - 0.5 x 0.2 x 0.3 cm nodule in L breast 1:00 N4; possible correlate to MR finding in L breast 12-1:00; needle bx, coil clip = ductal hyperplasia, stromal fibrosis; concordant; 1 year US\par - no sonographic correlate for enhancement in L lower lateral breast; MR bx\par \par MR needle bx 11/11/21\par - L LOQ, cork clip is 4 cm medially displaced = fatty tissue, stromal fibrosis\par - L 12-1:00 posterior, stoplight clip is 4 cm medially displaced = fatty tissue, stromal fibrosis\par - concordant; 1 yr MRI\par \par Surgical pathology 1/3/22\par - R lumpectomy = DCIS, 0.5 cm \par \par B/l mammogram and US 6/28/22\par - heterogenously dense\par - calcifications in L upper deep posterior breast, stable\par - nonspecific tissue at 3 bx clips in L breast \par - 0.5 x 0.4 x 0.5 cm multilobulated nodule in L breast 1:00 N4; [thought to be previously biopsied but later found not to be previously biopsied]; US bx\par - BIRADS 4\par \par US needle bx 7/8/22\par - L breast 1:00 N4, heart clip = DCIS, low grade, %, %; note that heart clip is at separate location than previous coil clip\par - on review, previously biopsied lesionat the site of the coil clip is no longer seen on US

## 2022-07-13 ENCOUNTER — NON-APPOINTMENT (OUTPATIENT)
Age: 87
End: 2022-07-13

## 2022-07-14 ENCOUNTER — OUTPATIENT (OUTPATIENT)
Dept: OUTPATIENT SERVICES | Facility: HOSPITAL | Age: 87
LOS: 1 days | End: 2022-07-14
Payer: MEDICARE

## 2022-07-14 DIAGNOSIS — Z98.49 CATARACT EXTRACTION STATUS, UNSPECIFIED EYE: Chronic | ICD-10-CM

## 2022-07-14 DIAGNOSIS — Z01.818 ENCOUNTER FOR OTHER PREPROCEDURAL EXAMINATION: ICD-10-CM

## 2022-07-14 DIAGNOSIS — Z98.890 OTHER SPECIFIED POSTPROCEDURAL STATES: Chronic | ICD-10-CM

## 2022-07-14 DIAGNOSIS — Z96.641 PRESENCE OF RIGHT ARTIFICIAL HIP JOINT: Chronic | ICD-10-CM

## 2022-07-14 DIAGNOSIS — D05.12 INTRADUCTAL CARCINOMA IN SITU OF LEFT BREAST: ICD-10-CM

## 2022-07-14 LAB
ALBUMIN SERPL ELPH-MCNC: 3.4 G/DL — SIGNIFICANT CHANGE UP (ref 3.3–5)
ALP SERPL-CCNC: 53 U/L — SIGNIFICANT CHANGE UP (ref 40–120)
ALT FLD-CCNC: 26 U/L — SIGNIFICANT CHANGE UP (ref 12–78)
ANION GAP SERPL CALC-SCNC: 5 MMOL/L — SIGNIFICANT CHANGE UP (ref 5–17)
AST SERPL-CCNC: 19 U/L — SIGNIFICANT CHANGE UP (ref 15–37)
BASOPHILS # BLD AUTO: 0.05 K/UL — SIGNIFICANT CHANGE UP (ref 0–0.2)
BASOPHILS NFR BLD AUTO: 1.2 % — SIGNIFICANT CHANGE UP (ref 0–2)
BILIRUB SERPL-MCNC: 0.3 MG/DL — SIGNIFICANT CHANGE UP (ref 0.2–1.2)
BUN SERPL-MCNC: 20 MG/DL — SIGNIFICANT CHANGE UP (ref 7–23)
CALCIUM SERPL-MCNC: 9.6 MG/DL — SIGNIFICANT CHANGE UP (ref 8.5–10.1)
CHLORIDE SERPL-SCNC: 106 MMOL/L — SIGNIFICANT CHANGE UP (ref 96–108)
CO2 SERPL-SCNC: 31 MMOL/L — SIGNIFICANT CHANGE UP (ref 22–31)
CREAT SERPL-MCNC: 0.52 MG/DL — SIGNIFICANT CHANGE UP (ref 0.5–1.3)
EGFR: 91 ML/MIN/1.73M2 — SIGNIFICANT CHANGE UP
EOSINOPHIL # BLD AUTO: 0.08 K/UL — SIGNIFICANT CHANGE UP (ref 0–0.5)
EOSINOPHIL NFR BLD AUTO: 1.9 % — SIGNIFICANT CHANGE UP (ref 0–6)
GLUCOSE SERPL-MCNC: 131 MG/DL — HIGH (ref 70–99)
HCT VFR BLD CALC: 41.2 % — SIGNIFICANT CHANGE UP (ref 34.5–45)
HGB BLD-MCNC: 13.7 G/DL — SIGNIFICANT CHANGE UP (ref 11.5–15.5)
IMM GRANULOCYTES NFR BLD AUTO: 0.2 % — SIGNIFICANT CHANGE UP (ref 0–1.5)
LYMPHOCYTES # BLD AUTO: 1.27 K/UL — SIGNIFICANT CHANGE UP (ref 1–3.3)
LYMPHOCYTES # BLD AUTO: 29.7 % — SIGNIFICANT CHANGE UP (ref 13–44)
MCHC RBC-ENTMCNC: 30 PG — SIGNIFICANT CHANGE UP (ref 27–34)
MCHC RBC-ENTMCNC: 33.3 GM/DL — SIGNIFICANT CHANGE UP (ref 32–36)
MCV RBC AUTO: 90.4 FL — SIGNIFICANT CHANGE UP (ref 80–100)
MONOCYTES # BLD AUTO: 0.55 K/UL — SIGNIFICANT CHANGE UP (ref 0–0.9)
MONOCYTES NFR BLD AUTO: 12.9 % — SIGNIFICANT CHANGE UP (ref 2–14)
NEUTROPHILS # BLD AUTO: 2.31 K/UL — SIGNIFICANT CHANGE UP (ref 1.8–7.4)
NEUTROPHILS NFR BLD AUTO: 54.1 % — SIGNIFICANT CHANGE UP (ref 43–77)
PLATELET # BLD AUTO: 189 K/UL — SIGNIFICANT CHANGE UP (ref 150–400)
POTASSIUM SERPL-MCNC: 3.5 MMOL/L — SIGNIFICANT CHANGE UP (ref 3.5–5.3)
POTASSIUM SERPL-SCNC: 3.5 MMOL/L — SIGNIFICANT CHANGE UP (ref 3.5–5.3)
PROT SERPL-MCNC: 7.3 GM/DL — SIGNIFICANT CHANGE UP (ref 6–8.3)
RBC # BLD: 4.56 M/UL — SIGNIFICANT CHANGE UP (ref 3.8–5.2)
RBC # FLD: 14.3 % — SIGNIFICANT CHANGE UP (ref 10.3–14.5)
SODIUM SERPL-SCNC: 142 MMOL/L — SIGNIFICANT CHANGE UP (ref 135–145)
WBC # BLD: 4.27 K/UL — SIGNIFICANT CHANGE UP (ref 3.8–10.5)
WBC # FLD AUTO: 4.27 K/UL — SIGNIFICANT CHANGE UP (ref 3.8–10.5)

## 2022-07-14 PROCEDURE — 93010 ELECTROCARDIOGRAM REPORT: CPT

## 2022-07-14 PROCEDURE — 85025 COMPLETE CBC W/AUTO DIFF WBC: CPT

## 2022-07-14 PROCEDURE — 93005 ELECTROCARDIOGRAM TRACING: CPT

## 2022-07-14 PROCEDURE — 80053 COMPREHEN METABOLIC PANEL: CPT

## 2022-07-14 PROCEDURE — 36415 COLL VENOUS BLD VENIPUNCTURE: CPT

## 2022-07-14 RX ORDER — POVIDONE-IODINE 5 %
0 AEROSOL (ML) TOPICAL
Qty: 0 | Refills: 0 | DISCHARGE

## 2022-07-14 NOTE — CHART NOTE - NSCHARTNOTEFT_GEN_A_CORE
7/14/2022--87 y.o female scheduled for Left Breast Lumpectomy with Magseed  VS: BP- 140/60 P-79  T- 97.9  SpO2- 96%  Plan  1. Stop all NSAIDS, herbal supplements and vitamins for 7 days.  2. NPO at midnight.  3. Take the following medications --none--with small sips of water on the morning of your procedure/surgery.  4. Use EZ sponges as directed  5. Labs, EKG as per surgeon  6. PMD/Cardiology MARIA Owens  visit for optimization prior to surgery as per surgeon  7. COVID swab to be done 7/19/2022

## 2022-07-14 NOTE — ASU PATIENT PROFILE, ADULT - NSICDXPASTSURGICALHX_GEN_ALL_CORE_FT
PAST SURGICAL HISTORY:  History of lumpectomy of left breast 1/3/2022    S/P cataract extraction, unspecified laterality bilateral    S/P hip replacement, right 2016

## 2022-07-14 NOTE — ASU PATIENT PROFILE, ADULT - NSICDXPASTMEDICALHX_GEN_ALL_CORE_FT
PAST MEDICAL HISTORY:  Arthritis     Breast cancer, left     Ductal carcinoma of breast right breast    Hepatitis B resolved    Hip pain, acute, right     Hypertension     Shingles

## 2022-07-14 NOTE — ASU PATIENT PROFILE, ADULT - FALL HARM RISK - UNIVERSAL INTERVENTIONS
Bed in lowest position, wheels locked, appropriate side rails in place/Call bell, personal items and telephone in reach/Instruct patient to call for assistance before getting out of bed or chair/Non-slip footwear when patient is out of bed/[place] to call system/Physically safe environment - no spills, clutter or unnecessary equipment/Purposeful Proactive Rounding/Room/bathroom lighting operational, light cord in reach

## 2022-07-14 NOTE — ASU PATIENT PROFILE, ADULT - NSTOBACCONEVERSMOKERY/N_GEN_A
Patient's baseline creatinine is between 1 1-1 3  Recent creatinine was stable  Repeat BMP is due on 8/28   Will follow No

## 2022-07-15 ENCOUNTER — OUTPATIENT (OUTPATIENT)
Dept: OUTPATIENT SERVICES | Facility: HOSPITAL | Age: 87
LOS: 1 days | End: 2022-07-15
Payer: MEDICARE

## 2022-07-15 ENCOUNTER — APPOINTMENT (OUTPATIENT)
Dept: MAMMOGRAPHY | Facility: CLINIC | Age: 87
End: 2022-07-15

## 2022-07-15 DIAGNOSIS — Z00.8 ENCOUNTER FOR OTHER GENERAL EXAMINATION: ICD-10-CM

## 2022-07-15 DIAGNOSIS — Z96.641 PRESENCE OF RIGHT ARTIFICIAL HIP JOINT: Chronic | ICD-10-CM

## 2022-07-15 DIAGNOSIS — Z98.890 OTHER SPECIFIED POSTPROCEDURAL STATES: Chronic | ICD-10-CM

## 2022-07-15 DIAGNOSIS — D05.12 INTRADUCTAL CARCINOMA IN SITU OF LEFT BREAST: ICD-10-CM

## 2022-07-15 DIAGNOSIS — Z01.818 ENCOUNTER FOR OTHER PREPROCEDURAL EXAMINATION: ICD-10-CM

## 2022-07-15 DIAGNOSIS — Z98.49 CATARACT EXTRACTION STATUS, UNSPECIFIED EYE: Chronic | ICD-10-CM

## 2022-07-15 PROBLEM — C50.912 MALIGNANT NEOPLASM OF UNSPECIFIED SITE OF LEFT FEMALE BREAST: Chronic | Status: ACTIVE | Noted: 2022-07-14

## 2022-07-15 PROCEDURE — 19285 PERQ DEV BREAST 1ST US IMAG: CPT

## 2022-07-15 PROCEDURE — 19285 PERQ DEV BREAST 1ST US IMAG: CPT | Mod: LT

## 2022-07-15 PROCEDURE — C1739: CPT

## 2022-07-20 NOTE — HISTORY OF PRESENT ILLNESS
[FreeTextEntry1] : Ms. 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 She is s/p lumpectomy for a right breast DCIS (1/3/22), had a DCISionRT score of 3.7 (elevated range is 3.0-10.0) and elected not to have radiation. She has no breast symptoms. 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 Her family history is not significant for any breast cancer.

## 2022-07-20 NOTE — PHYSICAL EXAM
[Normocephalic] : normocephalic [Atraumatic] : atraumatic [Sclera nonicteric] : sclera nonicteric [Supple] : supple [No Supraclavicular Adenopathy] : no supraclavicular adenopathy [No Cervical Adenopathy] : no cervical adenopathy [Clear to Auscultation Bilat] : clear to auscultation bilaterally [Normal Sinus Rhythm] : normal sinus rhythm [Examined in the supine and seated position] : examined in the supine and seated position [Symmetrical] : symmetrical [Bra Size: ___] : Bra Size: [unfilled] [No dominant masses] : no dominant masses in right breast  [No dominant masses] : no dominant masses left breast [No Nipple Retraction] : no left nipple retraction [No Nipple Discharge] : no left nipple discharge [No Axillary Lymphadenopathy] : no left axillary lymphadenopathy [Soft] : abdomen soft [No Edema] : no edema [No Rashes] : no rashes [No Ulceration] : no ulceration [de-identified] : Superior periareolar scar [de-identified] : Post-biopsy ecchymosis in UOQ

## 2022-07-20 NOTE — DATA REVIEWED
[FreeTextEntry1] : B/l mammogram and US 8/12/21\par - heterogenously dense\par - calcifications in R UOQ\par - 0.3 cm nodule in R breast 11:00 N1, new\par - 0.2 cm calcified nodule in L breast 12:00 N5 with mammographic correlate\par - BIRADS 0\par \par R mammogram and US 9/2/21\par - calcifications in R UOQ are loose aggregate, punctate, round; 6 mo R mammogram\par - 0.4 x 0.4 x 0.5 cm irregular nodule in R breast 11:00 N1; US bx\par - BIRADS 4\par \par US bx 9/13/21\par - R breast 11:00 N1, coil clip = DCIS, low grade, ER 90%, NE 80%\par \par Breast MRI 9/27/21\par - bx clip in R  central breast with enhancing foci 1 cm anterior and 0.3 cm inferior to clip, and indeterminate enhancing focus 1.6 cm posterior to bx clip; wide excision\par - 3.5 cm linear enhancement in L  lateral slightly inferior breast; MR bx\par - 0.4 cm enhancing lesion in L breast 12-1:00 N5; MR bx\par - 0.5 cm linear enhancement in L retroareolar far posterior breast; unable to do MR bx due to location\par \par Attempted MR bx on 10/21/21, but patient did not tolerate positioning.\par \par US and US needle bx 11/4/21\par - 0.5 x 0.2 x 0.3 cm nodule in L breast 1:00 N4; possible correlate to MR finding in L breast 12-1:00; needle bx, coil clip = ductal hyperplasia, stromal fibrosis; concordant; 1 year US\par - no sonographic correlate for enhancement in L lower lateral breast; MR bx\par \par MR needle bx 11/11/21\par - L LOQ, cork clip is 4 cm medially displaced = fatty tissue, stromal fibrosis\par - L 12-1:00 posterior, stoplight clip is 4 cm medially displaced = fatty tissue, stromal fibrosis\par - concordant; 1 yr MRI\par \par Surgical pathology 1/3/22\par - R lumpectomy = DCIS, 0.5 cm \par \par B/l mammogram and US 6/28/22\par - heterogenously dense\par - calcifications in L upper deep posterior breast, stable\par - nonspecific tissue at 3 bx clips in L breast \par - 0.5 x 0.4 x 0.5 cm multilobulated nodule in L breast 1:00 N4; [thought to be previously biopsied but later found not to be previously biopsied]; US bx\par - BIRADS 4\par \par US needle bx 7/8/22\par - L breast 1:00 N4, heart clip = DCIS, low grade, %, %; note that heart clip is at separate location than previous coil clip\par - on review, previously biopsied lesionat the site of the coil clip is no longer seen on US

## 2022-07-22 ENCOUNTER — APPOINTMENT (OUTPATIENT)
Dept: BREAST CENTER | Facility: HOSPITAL | Age: 87
End: 2022-07-22

## 2022-07-22 ENCOUNTER — TRANSCRIPTION ENCOUNTER (OUTPATIENT)
Age: 87
End: 2022-07-22

## 2022-07-22 ENCOUNTER — OUTPATIENT (OUTPATIENT)
Dept: INPATIENT UNIT | Facility: HOSPITAL | Age: 87
LOS: 1 days | Discharge: HOME | End: 2022-07-22
Payer: MEDICARE

## 2022-07-22 ENCOUNTER — RESULT REVIEW (OUTPATIENT)
Age: 87
End: 2022-07-22

## 2022-07-22 ENCOUNTER — NON-APPOINTMENT (OUTPATIENT)
Age: 87
End: 2022-07-22

## 2022-07-22 VITALS
TEMPERATURE: 98 F | WEIGHT: 119.93 LBS | DIASTOLIC BLOOD PRESSURE: 57 MMHG | SYSTOLIC BLOOD PRESSURE: 139 MMHG | RESPIRATION RATE: 15 BRPM | HEART RATE: 70 BPM | HEIGHT: 62 IN

## 2022-07-22 VITALS
RESPIRATION RATE: 14 BRPM | DIASTOLIC BLOOD PRESSURE: 65 MMHG | HEART RATE: 75 BPM | SYSTOLIC BLOOD PRESSURE: 136 MMHG | OXYGEN SATURATION: 97 %

## 2022-07-22 DIAGNOSIS — Z98.890 OTHER SPECIFIED POSTPROCEDURAL STATES: Chronic | ICD-10-CM

## 2022-07-22 DIAGNOSIS — D05.12 INTRADUCTAL CARCINOMA IN SITU OF LEFT BREAST: ICD-10-CM

## 2022-07-22 DIAGNOSIS — Z96.641 PRESENCE OF RIGHT ARTIFICIAL HIP JOINT: Chronic | ICD-10-CM

## 2022-07-22 DIAGNOSIS — Z98.49 CATARACT EXTRACTION STATUS, UNSPECIFIED EYE: Chronic | ICD-10-CM

## 2022-07-22 PROCEDURE — 88305 TISSUE EXAM BY PATHOLOGIST: CPT | Mod: 26

## 2022-07-22 PROCEDURE — 88307 TISSUE EXAM BY PATHOLOGIST: CPT | Mod: 26

## 2022-07-22 PROCEDURE — 88307 TISSUE EXAM BY PATHOLOGIST: CPT

## 2022-07-22 PROCEDURE — 88342 IMHCHEM/IMCYTCHM 1ST ANTB: CPT | Mod: 26

## 2022-07-22 PROCEDURE — 88305 TISSUE EXAM BY PATHOLOGIST: CPT

## 2022-07-22 PROCEDURE — 13101 CMPLX RPR TRUNK 2.6-7.5 CM: CPT | Mod: 59

## 2022-07-22 PROCEDURE — 88342 IMHCHEM/IMCYTCHM 1ST ANTB: CPT

## 2022-07-22 PROCEDURE — 76098 X-RAY EXAM SURGICAL SPECIMEN: CPT | Mod: 26

## 2022-07-22 PROCEDURE — 19301 PARTIAL MASTECTOMY: CPT | Mod: LT

## 2022-07-22 PROCEDURE — 76098 X-RAY EXAM SURGICAL SPECIMEN: CPT

## 2022-07-22 RX ORDER — FENTANYL CITRATE 50 UG/ML
50 INJECTION INTRAVENOUS
Refills: 0 | Status: DISCONTINUED | OUTPATIENT
Start: 2022-07-22 | End: 2022-07-22

## 2022-07-22 RX ORDER — ONDANSETRON 8 MG/1
4 TABLET, FILM COATED ORAL ONCE
Refills: 0 | Status: DISCONTINUED | OUTPATIENT
Start: 2022-07-22 | End: 2022-07-22

## 2022-07-22 RX ORDER — SODIUM CHLORIDE 9 MG/ML
1000 INJECTION, SOLUTION INTRAVENOUS
Refills: 0 | Status: DISCONTINUED | OUTPATIENT
Start: 2022-07-22 | End: 2022-07-22

## 2022-07-22 RX ORDER — OXYCODONE HYDROCHLORIDE 5 MG/1
5 TABLET ORAL ONCE
Refills: 0 | Status: DISCONTINUED | OUTPATIENT
Start: 2022-07-22 | End: 2022-07-22

## 2022-07-22 RX ORDER — ASCORBIC ACID 60 MG
1 TABLET,CHEWABLE ORAL
Qty: 0 | Refills: 0 | DISCHARGE

## 2022-07-22 NOTE — ASU PATIENT PROFILE, ADULT - HEALTHCARE QUESTIONS, PROFILE
time of sx Cheek-To-Nose Interpolation Flap Text: A decision was made to reconstruct the defect utilizing an interpolation axial flap and a staged reconstruction.  A telfa template was made of the defect.  This telfa template was then used to outline the Cheek-To-Nose Interpolation flap.  The donor area for the pedicle flap was then injected with anesthesia.  The flap was excised through the skin and subcutaneous tissue down to the layer of the underlying musculature.  The interpolation flap was carefully excised within this deep plane to maintain its blood supply.  The edges of the donor site were undermined.   The donor site was closed in a primary fashion.  The pedicle was then rotated into position and sutured.  Once the tube was sutured into place, adequate blood supply was confirmed with blanching and refill.  The pedicle was then wrapped with xeroform gauze and dressed appropriately with a telfa and gauze bandage to ensure continued blood supply and protect the attached pedicle.

## 2022-07-22 NOTE — ASU PATIENT PROFILE, ADULT - FALL HARM RISK - HARM RISK INTERVENTIONS

## 2022-07-22 NOTE — ASU DISCHARGE PLAN (ADULT/PEDIATRIC) - CARE PROVIDER_API CALL
Jocelin Canela)  Surgery  [facility], [address] [place] [address]  Phone: [phone]  Fax: [phone]  Follow Up Time:

## 2022-07-22 NOTE — BRIEF OPERATIVE NOTE - NSICDXBRIEFPOSTOP_GEN_ALL_CORE_FT
POST-OP DIAGNOSIS:  Ductal carcinoma in situ (DCIS) of left breast 22-Jul-2022 13:51:18  Jocelin Canela

## 2022-07-22 NOTE — BRIEF OPERATIVE NOTE - NSICDXBRIEFPREOP_GEN_ALL_CORE_FT
PRE-OP DIAGNOSIS:  Ductal carcinoma in situ (DCIS) of left breast 22-Jul-2022 13:51:15  Jocelin Canela

## 2022-07-22 NOTE — ASU DISCHARGE PLAN (ADULT/PEDIATRIC) - NS MD DC FALL RISK RISK
For information on Fall & Injury Prevention, visit: [email or web address] OR  [email or web address] OR  https://www.cdc.gov/steadi/patient.html

## 2022-07-22 NOTE — ASU PATIENT PROFILE, ADULT - AS SC BRADEN NUTRITION
Emerson states they did a benefits investigation and determines pt would pay over $14,000 out of pocket. I informed them pt can not afford this. She will pass his application on to see if he qualifies for free drug. Determination will be aprox 2 days.   
HUI Garg was approved for free drug. He will be receiving his medication next week.   
JANNETH - [place] received Jadenu through the  at no cost on Monday 3/1.   
Paula calling in regards to pt. Would like a call back.    Call Back: [phone] Ext.[phone]  
(4) excellent

## 2022-07-22 NOTE — BRIEF OPERATIVE NOTE - OPERATION/FINDINGS
Intraop xray shows Magseed and heart clip in specimen. Another coil biopsy clip was visualized and removed during excision of lateral margin.

## 2022-07-25 ENCOUNTER — NON-APPOINTMENT (OUTPATIENT)
Age: 87
End: 2022-07-25

## 2022-07-27 DIAGNOSIS — M19.90 UNSPECIFIED OSTEOARTHRITIS, UNSPECIFIED SITE: ICD-10-CM

## 2022-07-27 DIAGNOSIS — D05.12 INTRADUCTAL CARCINOMA IN SITU OF LEFT BREAST: ICD-10-CM

## 2022-07-27 DIAGNOSIS — N60.02 SOLITARY CYST OF LEFT BREAST: ICD-10-CM

## 2022-07-27 DIAGNOSIS — I10 ESSENTIAL (PRIMARY) HYPERTENSION: ICD-10-CM

## 2022-07-27 DIAGNOSIS — N60.82 OTHER BENIGN MAMMARY DYSPLASIAS OF LEFT BREAST: ICD-10-CM

## 2022-07-27 DIAGNOSIS — N64.89 OTHER SPECIFIED DISORDERS OF BREAST: ICD-10-CM

## 2022-07-27 DIAGNOSIS — E78.00 PURE HYPERCHOLESTEROLEMIA, UNSPECIFIED: ICD-10-CM

## 2022-07-27 DIAGNOSIS — N60.22 FIBROADENOSIS OF LEFT BREAST: ICD-10-CM

## 2022-08-01 ENCOUNTER — APPOINTMENT (OUTPATIENT)
Dept: BREAST CENTER | Facility: CLINIC | Age: 87
End: 2022-08-01

## 2022-08-01 VITALS
WEIGHT: 120 LBS | BODY MASS INDEX: 21.53 KG/M2 | HEART RATE: 74 BPM | DIASTOLIC BLOOD PRESSURE: 71 MMHG | HEIGHT: 62.5 IN | SYSTOLIC BLOOD PRESSURE: 131 MMHG

## 2022-08-01 DIAGNOSIS — D05.12 INTRADUCTAL CARCINOMA IN SITU OF LEFT BREAST: ICD-10-CM

## 2022-08-01 DIAGNOSIS — D05.11 INTRADUCTAL CARCINOMA IN SITU OF RIGHT BREAST: ICD-10-CM

## 2022-08-01 PROCEDURE — 99024 POSTOP FOLLOW-UP VISIT: CPT

## 2022-08-01 NOTE — PHYSICAL EXAM
[Normocephalic] : normocephalic [Atraumatic] : atraumatic [Sclera nonicteric] : sclera nonicteric [No Cervical Adenopathy] : no cervical adenopathy [Examined in the supine and seated position] : examined in the supine and seated position [Symmetrical] : symmetrical [Bra Size: ___] : Bra Size: [unfilled] [No dominant masses] : no dominant masses in right breast  [No dominant masses] : no dominant masses left breast [No Nipple Retraction] : no left nipple retraction [No Nipple Discharge] : no left nipple discharge [No Edema] : no edema [No Rashes] : no rashes [No Ulceration] : no ulceration [de-identified] : Superior periareolar scar [de-identified] : Upper outer periareolar incision clean and intact

## 2022-08-01 NOTE — HISTORY OF PRESENT ILLNESS
[FreeTextEntry1] : Ms. 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 Her breast history is also significant for a right breast DCIS s/p R lumpectomy (1/3/22), with a DCISionRT score of 3.7 (elevated range is 3.0-10.0) for which she elected not to have radiation. Postoperatively, she is doing well. She took tylenol for pain control. 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 Her family history is not significant for any breast cancer.

## 2022-08-01 NOTE — DATA REVIEWED
[FreeTextEntry1] : B/l mammogram and US 8/12/21\par - heterogenously dense\par - calcifications in R UOQ\par - 0.3 cm nodule in R breast 11:00 N1, new\par - 0.2 cm calcified nodule in L breast 12:00 N5 with mammographic correlate\par - BIRADS 0\par \par R mammogram and US 9/2/21\par - calcifications in R UOQ are loose aggregate, punctate, round; 6 mo R mammogram\par - 0.4 x 0.4 x 0.5 cm irregular nodule in R breast 11:00 N1; US bx\par - BIRADS 4\par \par US bx 9/13/21\par - R breast 11:00 N1, coil clip = DCIS, low grade, ER 90%, MD 80%\par \par Breast MRI 9/27/21\par - bx clip in R  central breast with enhancing foci 1 cm anterior and 0.3 cm inferior to clip, and indeterminate enhancing focus 1.6 cm posterior to bx clip; wide excision\par - 3.5 cm linear enhancement in L  lateral slightly inferior breast; MR bx\par - 0.4 cm enhancing lesion in L breast 12-1:00 N5; MR bx\par - 0.5 cm linear enhancement in L retroareolar far posterior breast; unable to do MR bx due to location\par \par Attempted MR bx on 10/21/21, but patient did not tolerate positioning.\par \par US and US needle bx 11/4/21\par - 0.5 x 0.2 x 0.3 cm nodule in L breast 1:00 N4; possible correlate to MR finding in L breast 12-1:00; needle bx, coil clip = ductal hyperplasia, stromal fibrosis; concordant; 1 year US\par - no sonographic correlate for enhancement in L lower lateral breast; MR bx\par \par MR needle bx 11/11/21\par - L LOQ, cork clip is 4 cm medially displaced = fatty tissue, stromal fibrosis\par - L 12-1:00 posterior, stoplight clip is 4 cm medially displaced = fatty tissue, stromal fibrosis\par - concordant; 1 yr MRI\par \par Surgical pathology 1/3/22\par - R lumpectomy = DCIS, 0.5 cm \par \par B/l mammogram and US 6/28/22\par - heterogenously dense\par - calcifications in L upper deep posterior breast, stable\par - nonspecific tissue at 3 bx clips in L breast \par - 0.5 x 0.4 x 0.5 cm multilobulated nodule in L breast 1:00 N4; [thought to be previously biopsied but later found not to be previously biopsied]; US bx\par - BIRADS 4\par \par US needle bx 7/8/22\par - L breast 1:00 N4, heart clip = DCIS, low grade, %, %; note that heart clip is at separate location than previous coil clip\par - on review, previously biopsied lesion at the site of the coil clip is no longer seen on US\par \par Surgical pathology 7/22/22\par - L lumpectomy = DCIS

## 2022-08-11 ENCOUNTER — APPOINTMENT (OUTPATIENT)
Dept: RADIATION ONCOLOGY | Facility: CLINIC | Age: 87
End: 2022-08-11

## 2022-08-11 VITALS
DIASTOLIC BLOOD PRESSURE: 62 MMHG | HEART RATE: 81 BPM | HEIGHT: 62.5 IN | SYSTOLIC BLOOD PRESSURE: 110 MMHG | OXYGEN SATURATION: 98 % | RESPIRATION RATE: 20 BRPM | BODY MASS INDEX: 21.53 KG/M2 | WEIGHT: 120 LBS

## 2022-08-11 PROCEDURE — 99215 OFFICE O/P EST HI 40 MIN: CPT

## 2022-08-11 RX ORDER — CLOTRIMAZOLE 10 MG/ML
1 SOLUTION TOPICAL
Qty: 30 | Refills: 0 | Status: ACTIVE | COMMUNITY
Start: 2022-06-08

## 2022-08-11 NOTE — HISTORY OF PRESENT ILLNESS
[FreeTextEntry1] : This is an 87 year old female diagnosed with bilateral breast DCIS referred by Dr. Canela. \par \par Routine mammogram/sono performed on 8/12/21 showed a 3 mm nodule in the right breast at 11:00. Right breast microcalcifications seen. \par \par Biopsy of the right breast at 11:00, 1 cm FN performed 9/13/21 showed ductal carcinoma in situ, solid variant, low nuclear grade. Invasive carcinoma is not identified. Detached clusters of malignant cells are noted within blood clots. ER >90% positive, NH 80% moderately positive. \par \par Bilateral breast MRI performed on 9/27/21 showed susceptibility artifact from the biopsy clip in the middle depth of the central to nipple slightly lateral right breast with surrounding possible suspicious enhancement extending 1.0 cm anteriorly from the clip and 1.6 cm posterior to the clip. In the left breast-  Clumped 3.5 cm region of linear enhancement in the slightly inferior lateral left quadrant which extends superiorly to the skin, just above the nipple. Suspicious enhancing 4 mm lesion at the approximate left 12-1:00 position, 5 cm FN. Linear 0.5 cm region of enhancement in the far retroareolar left breast, suspicious in kinetics. 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 She met with [facility] in December 2021 to discuss her gene mutation findings.\par \par Biopsy of the left breast at 1:00, 4 cm FN performed on 11/2/21 was negative for malignancy. Ductal hyperplasia, non-atypical.\par Additional biopsy of the left breast at 11-12:00 performed 11/11/21 showed predominantly fatty tissue with patchy benign breast tissue and stromal fibrosis stromal fibrosis. \par \par Right breast lumpectomy with Magseed localization performed 1/3/22 by Dr. Canela. Pathology showed DCIS, solid, cribriform and papillary types with intermediate nuclear grade, focal sclerosis and microcalcifications, largest contiguous focus measured 5 mm. ER 90% positive, NH 80% positive. No invasive component present. Margins negative. Distance from closest margin is 7 mm. No lymph nodes submitted. \par \par Reports feeling well.  Has no specific complaints.\par \par She was assessed to have newly diagnosed stage 0, [id number], DCIS of the right breast, ER/NH positive. Patient has undergone right breast lumpectomy with negative >2 mm surgical margins. The tumor is of intermediate nuclear grade and measured 5 mm on the pathologic specimen indicating generally low risk of local recurrence. However, DCISionRT score result on 1/3/22 was elevated at 3.7. She declined RT at this time due to too much stress regarding the passing of her . \par \par Bilateral mammogram/sono performed on 6/28/22 showed a slight increase in the size and change in configuration of the previously biopsied nodule at the left 1:00 axis, 4 cm FN. Stable right upper outer quadrant calcifications. \par \par Ultrasound guided biopsy of the left breast at 1:00, 4 cm FN, performed on 7/8/22 showed DCIS, predominantly cribriform type, low nuclear grade. ER/% positive. \par \par She had a left breast lumpectomy performed by Dr. Canela on 7/22/22. Pathology showed DCIS, papillary and cribriform type, low to intermediate grade, largest focus measuring 7.0 mm. Margins negative. Distance to closest margin at least 5.0 mm. No lymph nodes submitted. \par \par She was given a referral to see Dr. Mcfadden for anti-hormonal therapy but postponed as she was awaiting results of her left breast biopsy. She has yet to go for a consultation. She is planning to move to assisted living in [facility] in mid-September. Due for MRI in November 2022. \par \par She presents for consideration of RT for the left breast DCIS.  Dr. Canela ordered a DCISionRT score last week- results are pending.   Patietn reports feeling well..  No complaints.

## 2022-08-11 NOTE — PHYSICAL EXAM
[Normal] : oriented to person, place and time, the affect was normal, the mood was normal and not anxious [de-identified] : Healing periareolar incison left breast.

## 2022-08-11 NOTE — DISEASE MANAGEMENT
[Pathological] : TNM Stage: p [0] : 0 [FreeTextEntry4] : DCIS right breast, gr 2, ER/OR + [TTNM] : is [NTNM] : o [MTNM] : o

## 2022-08-29 ENCOUNTER — APPOINTMENT (OUTPATIENT)
Dept: RADIATION ONCOLOGY | Facility: CLINIC | Age: 87
End: 2022-08-29

## 2022-08-29 VITALS
WEIGHT: 124 LBS | SYSTOLIC BLOOD PRESSURE: 144 MMHG | DIASTOLIC BLOOD PRESSURE: 70 MMHG | BODY MASS INDEX: 22.82 KG/M2 | HEIGHT: 62 IN

## 2022-08-29 PROCEDURE — 77263 THER RADIOLOGY TX PLNG CPLX: CPT

## 2022-08-29 PROCEDURE — 77290 THER RAD SIMULAJ FIELD CPLX: CPT

## 2022-08-29 PROCEDURE — 99214 OFFICE O/P EST MOD 30 MIN: CPT

## 2022-08-29 PROCEDURE — 77333 RADIATION TREATMENT AID(S): CPT

## 2022-08-29 NOTE — DISEASE MANAGEMENT
[Pathological] : TNM Stage: p [0] : 0 [FreeTextEntry4] : DCIS right breast, gr 2, ER/[place] + [TTNM] : is [NTNM] : o [MTNM] : o

## 2022-08-29 NOTE — HISTORY OF PRESENT ILLNESS
[FreeTextEntry1] : This is an 87 year old female diagnosed with bilateral breast DCIS referred by Dr. Canela. \par \par Routine mammogram/sono performed on 8/12/21 showed a 3 mm nodule in the right breast at 11:00. Right breast microcalcifications seen. \par \par Biopsy of the right breast at 11:00, 1 cm FN performed 9/13/21 showed ductal carcinoma in situ, solid variant, low nuclear grade. Invasive carcinoma is not identified. Detached clusters of malignant cells are noted within blood clots. ER >90% positive, [place] 80% moderately positive. \par \par Bilateral breast MRI performed on 9/27/21 showed susceptibility artifact from the biopsy clip in the middle depth of the central to nipple slightly lateral right breast with surrounding possible suspicious enhancement extending 1.0 cm anteriorly from the clip and 1.6 cm posterior to the clip. In the left breast-  Clumped 3.5 cm region of linear enhancement in the slightly inferior lateral left quadrant which extends superiorly to the skin, just above the nipple. Suspicious enhancing 4 mm lesion at the approximate left 12-1:00 position, 5 cm FN. Linear 0.5 cm region of enhancement in the far retroareolar left breast, suspicious in kinetics. 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 She met with [facility] in December 2021 to discuss her gene mutation findings.\par \par Biopsy of the left breast at 1:00, 4 cm FN performed on 11/2/21 was negative for malignancy. Ductal hyperplasia, non-atypical.\par Additional biopsy of the left breast at 11-12:00 performed 11/11/21 showed predominantly fatty tissue with patchy benign breast tissue and stromal fibrosis stromal fibrosis. \par \par Right breast lumpectomy with Magseed localization performed 1/3/22 by Dr. Canela. Pathology showed DCIS, solid, cribriform and papillary types with intermediate nuclear grade, focal sclerosis and microcalcifications, largest contiguous focus measured 5 mm. ER 90% positive, [place] 80% positive. No invasive component present. Margins negative. Distance from closest margin is 7 mm. No lymph nodes submitted. \par \par Reports feeling well.  Has no specific complaints.\par \par She was assessed to have newly diagnosed stage 0, [id number], DCIS of the right breast, ER/[place] positive. Patient has undergone right breast lumpectomy with negative >2 mm surgical margins. The tumor is of intermediate nuclear grade and measured 5 mm on the pathologic specimen indicating generally low risk of local recurrence. However, DCISionRT score result on 1/3/22 was elevated at 3.7. She declined RT at this time due to too much stress regarding the passing of her . \par \par Bilateral mammogram/sono performed on 6/28/22 showed a slight increase in the size and change in configuration of the previously biopsied nodule at the left 1:00 axis, 4 cm FN. Stable right upper outer quadrant calcifications. \par \par Ultrasound guided biopsy of the left breast at 1:00, 4 cm FN, performed on 7/8/22 showed DCIS, predominantly cribriform type, low nuclear grade. ER/% positive. \par \par She had a left breast lumpectomy performed by Dr. Canela on 7/22/22. Pathology showed DCIS, papillary and cribriform type, low to intermediate grade, largest focus measuring 7.0 mm. Margins negative. Distance to closest margin at least 5.0 mm. No lymph nodes submitted. \par \par She was given a referral to see Dr. Mcfadden for anti-hormonal therapy but postponed as she was awaiting results of her left breast biopsy. She has yet to go for a consultation. She is planning to move to assisted living in [facility] in mid-September. Due for MRI in November 2022. \par \par In summary, taran has a history of a previous right breast DCIS in January 2022 for which she underwent lumpectomy but declined RT despite elevated risk on DCISionRt score. She now has a newly diagnosed stage 0, [id number], DCIS of the left breast, ER/[place] positive. Patient has undergone left breast lumpectomy with negative >2 mm surgical margins. The tumor is of intermediate nuclear grade and measured 7 mm on the pathologic specimen indicating generally low risk of local recurrence.\par \par DCISionRT score performed 7/22/22 on left breast specimen was elevated at 6.8. \par \par She is feeling well. She presents for a follow up and RT treatment planning.

## 2022-09-01 PROCEDURE — 77334 RADIATION TREATMENT AID(S): CPT

## 2022-09-01 PROCEDURE — 77295 3-D RADIOTHERAPY PLAN: CPT

## 2022-09-01 PROCEDURE — 77300 RADIATION THERAPY DOSE PLAN: CPT

## 2022-09-06 PROCEDURE — 77280 THER RAD SIMULAJ FIELD SMPL: CPT

## 2022-09-07 PROCEDURE — G6017: CPT

## 2022-09-07 PROCEDURE — G6012: CPT

## 2022-09-07 PROCEDURE — 77427 RADIATION TX MANAGEMENT X5: CPT

## 2022-09-08 ENCOUNTER — NON-APPOINTMENT (OUTPATIENT)
Age: 87
End: 2022-09-08

## 2022-09-08 VITALS
HEIGHT: 62 IN | OXYGEN SATURATION: 98 % | BODY MASS INDEX: 22.82 KG/M2 | WEIGHT: 124 LBS | RESPIRATION RATE: 18 BRPM | HEART RATE: 95 BPM

## 2022-09-08 PROCEDURE — G6017: CPT

## 2022-09-08 PROCEDURE — G6012: CPT

## 2022-09-08 NOTE — DISEASE MANAGEMENT
[Pathological] : TNM Stage: p [0] : 0 [FreeTextEntry4] : DCIS right breast, gr 2, ER/[place] + [TTNM] : is [NTNM] : o [MTNM] : o [de-identified] : 1042 [de-identified] : 1322 [de-identified] : Left Breast

## 2022-09-08 NOTE — HISTORY OF PRESENT ILLNESS
[FreeTextEntry1] : This is an 87 year old female diagnosed with bilateral breast DCIS referred by Dr. Canela. \par \par Routine mammogram/sono performed on 8/12/21 showed a 3 mm nodule in the right breast at 11:00. Right breast microcalcifications seen. \par \par Biopsy of the right breast at 11:00, 1 cm FN performed 9/13/21 showed ductal carcinoma in situ, solid variant, low nuclear grade. Invasive carcinoma is not identified. Detached clusters of malignant cells are noted within blood clots. ER >90% positive, [place] 80% moderately positive. \par \par Bilateral breast MRI performed on 9/27/21 showed susceptibility artifact from the biopsy clip in the middle depth of the central to nipple slightly lateral right breast with surrounding possible suspicious enhancement extending 1.0 cm anteriorly from the clip and 1.6 cm posterior to the clip. In the left breast-  Clumped 3.5 cm region of linear enhancement in the slightly inferior lateral left quadrant which extends superiorly to the skin, just above the nipple. Suspicious enhancing 4 mm lesion at the approximate left 12-1:00 position, 5 cm FN. Linear 0.5 cm region of enhancement in the far retroareolar left breast, suspicious in kinetics. 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 She met with [facility] in December 2021 to discuss her gene mutation findings.\par \par Biopsy of the left breast at 1:00, 4 cm FN performed on 11/2/21 was negative for malignancy. Ductal hyperplasia, non-atypical.\par Additional biopsy of the left breast at 11-12:00 performed 11/11/21 showed predominantly fatty tissue with patchy benign breast tissue and stromal fibrosis stromal fibrosis. \par \par Right breast lumpectomy with Magseed localization performed 1/3/22 by Dr. Canela. Pathology showed DCIS, solid, cribriform and papillary types with intermediate nuclear grade, focal sclerosis and microcalcifications, largest contiguous focus measured 5 mm. ER 90% positive, [place] 80% positive. No invasive component present. Margins negative. Distance from closest margin is 7 mm. No lymph nodes submitted. \par \par Reports feeling well.  Has no specific complaints.\par \par She was assessed to have newly diagnosed stage 0, [id number], DCIS of the right breast, ER/[place] positive. Patient has undergone right breast lumpectomy with negative >2 mm surgical margins. The tumor is of intermediate nuclear grade and measured 5 mm on the pathologic specimen indicating generally low risk of local recurrence. However, DCISionRT score result on 1/3/22 was elevated at 3.7. She declined RT at this time due to too much stress regarding the passing of her . \par \par Bilateral mammogram/sono performed on 6/28/22 showed a slight increase in the size and change in configuration of the previously biopsied nodule at the left 1:00 axis, 4 cm FN. Stable right upper outer quadrant calcifications. \par \par Ultrasound guided biopsy of the left breast at 1:00, 4 cm FN, performed on 7/8/22 showed DCIS, predominantly cribriform type, low nuclear grade. ER/% positive. \par \par She had a left breast lumpectomy performed by Dr. Canela on 7/22/22. Pathology showed DCIS, papillary and cribriform type, low to intermediate grade, largest focus measuring 7.0 mm. Margins negative. Distance to closest margin at least 5.0 mm. No lymph nodes submitted. \par \par She was given a referral to see Dr. Mcfadden for anti-hormonal therapy but postponed as she was awaiting results of her left breast biopsy. She has yet to go for a consultation. She is planning to move to assisted living in [facility] in mid-September. Due for MRI in November 2022. \par \par In summary, taran has a history of a previous right breast DCIS in January 2022 for which she underwent lumpectomy but declined RT despite elevated risk on DCISionRt score. She now has a newly diagnosed stage 0, [id number], DCIS of the left breast, ER/[place] positive. Patient has undergone left breast lumpectomy with negative >2 mm surgical margins. The tumor is of intermediate nuclear grade and measured 7 mm on the pathologic specimen indicating generally low risk of local recurrence.\par \par DCISionRT score performed 7/22/22 on left breast specimen was elevated at 6.8. \par \par She is currently undergoing RT to the left breast.\par \par She presents for an OTV 2/5 fx. Feeling well. Moisturizing with Eucerin.

## 2022-09-08 NOTE — DISEASE MANAGEMENT
[Pathological] : TNM Stage: p [FreeTextEntry4] : DCIS right breast, gr 2, ER/DC + [TTNM] : is [NTNM] : o [MTNM] : o [0] : 0 [de-identified] : 2348 [de-identified] : 2146 [de-identified] : Left Breast

## 2022-09-08 NOTE — HISTORY OF PRESENT ILLNESS
[FreeTextEntry1] : This is an 87 year old female diagnosed with bilateral breast DCIS referred by Dr. Canela. \par \par Routine mammogram/sono performed on 8/12/21 showed a 3 mm nodule in the right breast at 11:00. Right breast microcalcifications seen. \par \par Biopsy of the right breast at 11:00, 1 cm FN performed 9/13/21 showed ductal carcinoma in situ, solid variant, low nuclear grade. Invasive carcinoma is not identified. Detached clusters of malignant cells are noted within blood clots. ER >90% positive, PA 80% moderately positive. \par \par Bilateral breast MRI performed on 9/27/21 showed susceptibility artifact from the biopsy clip in the middle depth of the central to nipple slightly lateral right breast with surrounding possible suspicious enhancement extending 1.0 cm anteriorly from the clip and 1.6 cm posterior to the clip. In the left breast-  Clumped 3.5 cm region of linear enhancement in the slightly inferior lateral left quadrant which extends superiorly to the skin, just above the nipple. Suspicious enhancing 4 mm lesion at the approximate left 12-1:00 position, 5 cm FN. Linear 0.5 cm region of enhancement in the far retroareolar left breast, suspicious in kinetics. 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 She met with [facility] in December 2021 to discuss her gene mutation findings.\par \par Biopsy of the left breast at 1:00, 4 cm FN performed on 11/2/21 was negative for malignancy. Ductal hyperplasia, non-atypical.\par Additional biopsy of the left breast at 11-12:00 performed 11/11/21 showed predominantly fatty tissue with patchy benign breast tissue and stromal fibrosis stromal fibrosis. \par \par Right breast lumpectomy with Magseed localization performed 1/3/22 by Dr. Canela. Pathology showed DCIS, solid, cribriform and papillary types with intermediate nuclear grade, focal sclerosis and microcalcifications, largest contiguous focus measured 5 mm. ER 90% positive, PA 80% positive. No invasive component present. Margins negative. Distance from closest margin is 7 mm. No lymph nodes submitted. \par \par Reports feeling well.  Has no specific complaints.\par \par She was assessed to have newly diagnosed stage 0, [id number], DCIS of the right breast, ER/PA positive. Patient has undergone right breast lumpectomy with negative >2 mm surgical margins. The tumor is of intermediate nuclear grade and measured 5 mm on the pathologic specimen indicating generally low risk of local recurrence. However, DCISionRT score result on 1/3/22 was elevated at 3.7. She declined RT at this time due to too much stress regarding the passing of her . \par \par Bilateral mammogram/sono performed on 6/28/22 showed a slight increase in the size and change in configuration of the previously biopsied nodule at the left 1:00 axis, 4 cm FN. Stable right upper outer quadrant calcifications. \par \par Ultrasound guided biopsy of the left breast at 1:00, 4 cm FN, performed on 7/8/22 showed DCIS, predominantly cribriform type, low nuclear grade. ER/% positive. \par \par She had a left breast lumpectomy performed by Dr. Canela on 7/22/22. Pathology showed DCIS, papillary and cribriform type, low to intermediate grade, largest focus measuring 7.0 mm. Margins negative. Distance to closest margin at least 5.0 mm. No lymph nodes submitted. \par \par She was given a referral to see Dr. Mcfadden for anti-hormonal therapy but postponed as she was awaiting results of her left breast biopsy. She has yet to go for a consultation. She is planning to move to assisted living in [facility] in mid-September. Due for MRI in November 2022. \par \par In summary, taran has a history of a previous right breast DCIS in January 2022 for which she underwent lumpectomy but declined RT despite elevated risk on DCISionRt score. She now has a newly diagnosed stage 0, [id number], DCIS of the left breast, ER/PA positive. Patient has undergone left breast lumpectomy with negative >2 mm surgical margins. The tumor is of intermediate nuclear grade and measured 7 mm on the pathologic specimen indicating generally low risk of local recurrence.\par \par DCISionRT score performed 7/22/22 on left breast specimen was elevated at 6.8. \par \par She is currently undergoing RT to the left breast.\par \par She presents for an OTV 4/5 fx. Feeling well. Moisturizing with Eucerin.

## 2022-09-09 PROCEDURE — G6012: CPT

## 2022-09-09 PROCEDURE — 77417 THER RADIOLOGY PORT IMAGE(S): CPT

## 2022-09-12 ENCOUNTER — NON-APPOINTMENT (OUTPATIENT)
Age: 87
End: 2022-09-12

## 2022-09-12 PROCEDURE — G6017: CPT

## 2022-09-12 PROCEDURE — G6012: CPT

## 2022-09-13 PROCEDURE — 77336 RADIATION PHYSICS CONSULT: CPT

## 2022-09-13 PROCEDURE — G6017: CPT

## 2022-09-13 PROCEDURE — G6012: CPT

## 2022-09-27 ENCOUNTER — NON-APPOINTMENT (OUTPATIENT)
Age: 87
End: 2022-09-27

## 2022-09-27 ENCOUNTER — APPOINTMENT (OUTPATIENT)
Dept: RADIATION ONCOLOGY | Facility: CLINIC | Age: 87
End: 2022-09-27

## 2022-10-21 NOTE — PATIENT PROFILE ADULT - PRIMARY ROLES/RESPONSIBILITIES
homemaker/caregiver for other(s) H Plasty Text: Given the location of the defect, shape of the defect and the proximity to free margins a H-plasty was deemed most appropriate for repair.  Using a sterile surgical marker, the appropriate advancement arms of the H-plasty were drawn incorporating the defect and placing the expected incisions within the relaxed skin tension lines where possible. The area thus outlined was incised deep to adipose tissue with a #15 scalpel blade. The skin margins were undermined to an appropriate distance in all directions utilizing iris scissors.  The opposing advancement arms were then advanced into place in opposite direction and anchored with interrupted buried subcutaneous sutures.

## 2022-10-25 NOTE — REASON FOR VISIT
[Home] : at home, [unfilled] , at the time of the visit. [Medical Office: ([facility])___] : at the medical office located in  [Verbal consent obtained from patient] : the patient, [unfilled] [Routine On-Treatment] : a routine on-treatment visit for [Other: ___] : [unfilled]

## 2022-10-28 ENCOUNTER — APPOINTMENT (OUTPATIENT)
Dept: RADIATION ONCOLOGY | Facility: CLINIC | Age: 87
End: 2022-10-28

## 2022-10-28 PROCEDURE — 99024 POSTOP FOLLOW-UP VISIT: CPT

## 2022-10-28 NOTE — HISTORY OF PRESENT ILLNESS
[FreeTextEntry1] : This is an 87 year old female diagnosed with bilateral breast DCIS referred by Dr. Canela. \par \par Routine mammogram/sono performed on 8/12/21 showed a 3 mm nodule in the right breast at 11:00. Right breast microcalcifications seen. \par \par Biopsy of the right breast at 11:00, 1 cm FN performed 9/13/21 showed ductal carcinoma in situ, solid variant, low nuclear grade. Invasive carcinoma is not identified. Detached clusters of malignant cells are noted within blood clots. ER >90% positive, NV 80% moderately positive. \par \par Bilateral breast MRI performed on 9/27/21 showed susceptibility artifact from the biopsy clip in the middle depth of the central to nipple slightly lateral right breast with surrounding possible suspicious enhancement extending 1.0 cm anteriorly from the clip and 1.6 cm posterior to the clip. In the left breast-  Clumped 3.5 cm region of linear enhancement in the slightly inferior lateral left quadrant which extends superiorly to the skin, just above the nipple. Suspicious enhancing 4 mm lesion at the approximate left 12-1:00 position, 5 cm FN. Linear 0.5 cm region of enhancement in the far retroareolar left breast, suspicious in kinetics. 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 She met with [facility] in December 2021 to discuss her gene mutation findings.\par \par Biopsy of the left breast at 1:00, 4 cm FN performed on 11/2/21 was negative for malignancy. Ductal hyperplasia, non-atypical.\par Additional biopsy of the left breast at 11-12:00 performed 11/11/21 showed predominantly fatty tissue with patchy benign breast tissue and stromal fibrosis stromal fibrosis. \par \par Right breast lumpectomy with Magseed localization performed 1/3/22 by Dr. Canela. Pathology showed DCIS, solid, cribriform and papillary types with intermediate nuclear grade, focal sclerosis and microcalcifications, largest contiguous focus measured 5 mm. ER 90% positive, NV 80% positive. No invasive component present. Margins negative. Distance from closest margin is 7 mm. No lymph nodes submitted. \par \par Reports feeling well.  Has no specific complaints.\par \par She was assessed to have newly diagnosed stage 0, [id number], DCIS of the right breast, ER/NV positive. Patient has undergone right breast lumpectomy with negative >2 mm surgical margins. The tumor is of intermediate nuclear grade and measured 5 mm on the pathologic specimen indicating generally low risk of local recurrence. However, DCISionRT score result on 1/3/22 was elevated at 3.7. She declined RT at this time due to too much stress regarding the passing of her . \par \par Bilateral mammogram/sono performed on 6/28/22 showed a slight increase in the size and change in configuration of the previously biopsied nodule at the left 1:00 axis, 4 cm FN. Stable right upper outer quadrant calcifications. \par \par Ultrasound guided biopsy of the left breast at 1:00, 4 cm FN, performed on 7/8/22 showed DCIS, predominantly cribriform type, low nuclear grade. ER/% positive. \par \par She had a left breast lumpectomy performed by Dr. Canela on 7/22/22. Pathology showed DCIS, papillary and cribriform type, low to intermediate grade, largest focus measuring 7.0 mm. Margins negative. Distance to closest margin at least 5.0 mm. No lymph nodes submitted. \par \par She was given a referral to see Dr. Mcfadden for anti-hormonal therapy but postponed as she was awaiting results of her left breast biopsy. She has yet to go for a consultation. She is planning to move to assisted living in [facility] in mid-September. Due for MRI in November 2022. \par \par In summary, taran has a history of a previous right breast DCIS in January 2022 for which she underwent lumpectomy but declined RT despite elevated risk on DCISionRt score. She now has a newly diagnosed stage 0, [id number], DCIS of the left breast, ER/NV positive. Patient has undergone left breast lumpectomy with negative >2 mm surgical margins. The tumor is of intermediate nuclear grade and measured 7 mm on the pathologic specimen indicating generally low risk of local recurrence.\par \par DCISionRT score performed 7/22/22 on left breast specimen was elevated at 6.8. \par \par She completed post lumpectomy RT to the left breast, total dose of 2600 cGy in 5 fx, on 9/13/22. \par \par She consented to a telephone 2 month PTE.  Feeling well.  Had mild breast rash, but this resolved.  No complaints.

## 2022-10-28 NOTE — DISEASE MANAGEMENT
[Pathological] : TNM Stage: p [0] : 0 [FreeTextEntry4] : DCIS right breast, gr 2, ER/WA + [TTNM] : is [NTNM] : o [MTNM] : o

## 2023-04-04 NOTE — CONSULT NOTE ADULT - PROBLEM SELECTOR PROBLEM 4
Detail Level: Detailed Ductal carcinoma of breast Quality 226: Preventive Care And Screening: Tobacco Use: Screening And Cessation Intervention: Patient screened for tobacco use and is an ex/non-smoker Quality 130: Documentation Of Current Medications In The Medical Record: Current Medications Documented

## 2024-06-06 NOTE — ED ADULT NURSE NOTE - TEMPLATE LIST FOR HEAD TO TOE ASSESSMENT
General Plan: Offered Aklief topical to target comedones/ improve skin texture, patient declined at this time. May consider adding spironolactone in future if breakouts indicate underlying hormonal cause Detail Level: Zone Render In Strict Bullet Format?: No

## 2024-08-12 NOTE — ASU PATIENT PROFILE, ADULT - PATIENT KNOW
Detail Level: Detailed Introduction Text (Please End With A Colon): The following procedure was deferred: Size Of Lesion In Cm (Optional): 0 yes